# Patient Record
Sex: FEMALE | Race: OTHER | HISPANIC OR LATINO | URBAN - METROPOLITAN AREA
[De-identification: names, ages, dates, MRNs, and addresses within clinical notes are randomized per-mention and may not be internally consistent; named-entity substitution may affect disease eponyms.]

---

## 2020-07-29 ENCOUNTER — EMERGENCY (EMERGENCY)
Facility: HOSPITAL | Age: 35
LOS: 1 days | Discharge: HOME | End: 2020-07-29
Attending: EMERGENCY MEDICINE | Admitting: EMERGENCY MEDICINE
Payer: MEDICAID

## 2020-07-29 VITALS
SYSTOLIC BLOOD PRESSURE: 112 MMHG | OXYGEN SATURATION: 100 % | HEIGHT: 61 IN | HEART RATE: 58 BPM | TEMPERATURE: 98 F | DIASTOLIC BLOOD PRESSURE: 72 MMHG | WEIGHT: 132.28 LBS | RESPIRATION RATE: 17 BRPM

## 2020-07-29 VITALS
RESPIRATION RATE: 18 BRPM | HEART RATE: 75 BPM | DIASTOLIC BLOOD PRESSURE: 70 MMHG | OXYGEN SATURATION: 99 % | SYSTOLIC BLOOD PRESSURE: 117 MMHG

## 2020-07-29 DIAGNOSIS — M54.9 DORSALGIA, UNSPECIFIED: ICD-10-CM

## 2020-07-29 LAB
ANION GAP SERPL CALC-SCNC: 6 MMOL/L — SIGNIFICANT CHANGE UP (ref 5–17)
BASOPHILS # BLD AUTO: 0.01 K/UL — SIGNIFICANT CHANGE UP (ref 0–0.2)
BASOPHILS NFR BLD AUTO: 0.2 % — SIGNIFICANT CHANGE UP (ref 0–2)
BUN SERPL-MCNC: 10 MG/DL — SIGNIFICANT CHANGE UP (ref 7–23)
CALCIUM SERPL-MCNC: 8.9 MG/DL — SIGNIFICANT CHANGE UP (ref 8.4–10.5)
CHLORIDE SERPL-SCNC: 103 MMOL/L — SIGNIFICANT CHANGE UP (ref 96–108)
CO2 SERPL-SCNC: 31 MMOL/L — SIGNIFICANT CHANGE UP (ref 22–31)
CREAT SERPL-MCNC: 0.72 MG/DL — SIGNIFICANT CHANGE UP (ref 0.5–1.3)
D DIMER BLD IA.RAPID-MCNC: <150 NG/ML DDU — SIGNIFICANT CHANGE UP
EOSINOPHIL # BLD AUTO: 0.08 K/UL — SIGNIFICANT CHANGE UP (ref 0–0.5)
EOSINOPHIL NFR BLD AUTO: 1.4 % — SIGNIFICANT CHANGE UP (ref 0–6)
GLUCOSE SERPL-MCNC: 119 MG/DL — HIGH (ref 70–99)
HCT VFR BLD CALC: 38.5 % — SIGNIFICANT CHANGE UP (ref 34.5–45)
HGB BLD-MCNC: 12.9 G/DL — SIGNIFICANT CHANGE UP (ref 11.5–15.5)
IMM GRANULOCYTES NFR BLD AUTO: 0.3 % — SIGNIFICANT CHANGE UP (ref 0–1.5)
LYMPHOCYTES # BLD AUTO: 1.85 K/UL — SIGNIFICANT CHANGE UP (ref 1–3.3)
LYMPHOCYTES # BLD AUTO: 31.6 % — SIGNIFICANT CHANGE UP (ref 13–44)
MCHC RBC-ENTMCNC: 31.1 PG — SIGNIFICANT CHANGE UP (ref 27–34)
MCHC RBC-ENTMCNC: 33.5 GM/DL — SIGNIFICANT CHANGE UP (ref 32–36)
MCV RBC AUTO: 92.8 FL — SIGNIFICANT CHANGE UP (ref 80–100)
MONOCYTES # BLD AUTO: 0.31 K/UL — SIGNIFICANT CHANGE UP (ref 0–0.9)
MONOCYTES NFR BLD AUTO: 5.3 % — SIGNIFICANT CHANGE UP (ref 2–14)
NEUTROPHILS # BLD AUTO: 3.59 K/UL — SIGNIFICANT CHANGE UP (ref 1.8–7.4)
NEUTROPHILS NFR BLD AUTO: 61.2 % — SIGNIFICANT CHANGE UP (ref 43–77)
NRBC # BLD: 0 /100 WBCS — SIGNIFICANT CHANGE UP (ref 0–0)
PLATELET # BLD AUTO: 245 K/UL — SIGNIFICANT CHANGE UP (ref 150–400)
POTASSIUM SERPL-MCNC: 3.6 MMOL/L — SIGNIFICANT CHANGE UP (ref 3.5–5.3)
POTASSIUM SERPL-SCNC: 3.6 MMOL/L — SIGNIFICANT CHANGE UP (ref 3.5–5.3)
RBC # BLD: 4.15 M/UL — SIGNIFICANT CHANGE UP (ref 3.8–5.2)
RBC # FLD: 12 % — SIGNIFICANT CHANGE UP (ref 10.3–14.5)
SODIUM SERPL-SCNC: 140 MMOL/L — SIGNIFICANT CHANGE UP (ref 135–145)
WBC # BLD: 5.86 K/UL — SIGNIFICANT CHANGE UP (ref 3.8–10.5)
WBC # FLD AUTO: 5.86 K/UL — SIGNIFICANT CHANGE UP (ref 3.8–10.5)

## 2020-07-29 PROCEDURE — 93010 ELECTROCARDIOGRAM REPORT: CPT

## 2020-07-29 PROCEDURE — 72070 X-RAY EXAM THORAC SPINE 2VWS: CPT

## 2020-07-29 PROCEDURE — 71045 X-RAY EXAM CHEST 1 VIEW: CPT

## 2020-07-29 PROCEDURE — 99284 EMERGENCY DEPT VISIT MOD MDM: CPT | Mod: 25

## 2020-07-29 PROCEDURE — 85379 FIBRIN DEGRADATION QUANT: CPT

## 2020-07-29 PROCEDURE — 72070 X-RAY EXAM THORAC SPINE 2VWS: CPT | Mod: 26

## 2020-07-29 PROCEDURE — 96372 THER/PROPH/DIAG INJ SC/IM: CPT

## 2020-07-29 PROCEDURE — U0003: CPT

## 2020-07-29 PROCEDURE — 80048 BASIC METABOLIC PNL TOTAL CA: CPT

## 2020-07-29 PROCEDURE — 71045 X-RAY EXAM CHEST 1 VIEW: CPT | Mod: 26

## 2020-07-29 PROCEDURE — 81025 URINE PREGNANCY TEST: CPT

## 2020-07-29 PROCEDURE — 85027 COMPLETE CBC AUTOMATED: CPT

## 2020-07-29 PROCEDURE — 99285 EMERGENCY DEPT VISIT HI MDM: CPT

## 2020-07-29 PROCEDURE — 93005 ELECTROCARDIOGRAM TRACING: CPT

## 2020-07-29 PROCEDURE — 36415 COLL VENOUS BLD VENIPUNCTURE: CPT

## 2020-07-29 RX ORDER — KETOROLAC TROMETHAMINE 30 MG/ML
30 SYRINGE (ML) INJECTION ONCE
Refills: 0 | Status: DISCONTINUED | OUTPATIENT
Start: 2020-07-29 | End: 2020-07-29

## 2020-07-29 RX ORDER — LIDOCAINE 4 G/100G
1 CREAM TOPICAL ONCE
Refills: 0 | Status: COMPLETED | OUTPATIENT
Start: 2020-07-29 | End: 2020-07-29

## 2020-07-29 RX ADMIN — LIDOCAINE 1 PATCH: 4 CREAM TOPICAL at 13:04

## 2020-07-29 RX ADMIN — Medication 30 MILLIGRAM(S): at 13:03

## 2020-07-29 RX ADMIN — Medication 30 MILLIGRAM(S): at 14:47

## 2020-07-29 NOTE — ED PROVIDER NOTE - PATIENT PORTAL LINK FT
You can access the FollowMyHealth Patient Portal offered by [facility] by registering at the following website: [email or web address]. By joining [employer]’s FollowMyHealth portal, you will also be able to view your health information using other applications (apps) compatible with our system.

## 2020-07-29 NOTE — ED PROVIDER NOTE - OBJECTIVE STATEMENT
Patient states she has been experiencing upper back pain worse with movement. No fever or chills . No sob  No recent travel

## 2020-07-29 NOTE — ED PROVIDER NOTE - NSFOLLOWUPINSTRUCTIONS_ED_ALL_ED_FT
Dolor de espalda maliha en los adultos  Acute Back Pain, Adult  El dolor de espalda maliha es repentino y por lo general no dura mucho tiempo. Se debe generalmente a nickolas lesión de los músculos y tejidos de la espalda. La lesión puede ser el resultado de:  Estiramiento en exceso o desgarro (esguince) de un músculo o ligamento. Los ligamentos son tejidos que conectan los huesos. Levantar algo de forma incorrecta puede producir un esguince de espalda.Desgaste (degeneración) de los discos vertebrales. Los discos vertebrales son tejido circular que proporciona acolchonamiento a los huesos de la columna vertebral (vértebras).Movimientos de giro, kayla al practicar deportes o realizar trabajos de jardinería.Un golpe en la espalda.Artritis.Es posible que le realicen un examen físico, análisis de laboratorio u otros estudios de diagnóstico por imágenes para encontrar la causa del dolor. El dolor de espalda maliha generalmente desaparece con reposo y cuidados en la casa.  Sigue estas indicaciones en tu casa:  Control del dolor, el entumecimiento y la hinchazón     Padmini los medicamentos de venta lizette y los recetados solamente kayla se lo haya indicado el médico.El médico puede recomendarle que se aplique hielo corrina las primeras 24 a 48 horas después del comienzo del dolor. Para hacer esto:  Ponga el hielo en nickolas bolsa plástica.Coloque nickolas toalla entre la piel y la bolsa.Coloque el hielo corrina 20 minutos, 2 a 3 veces por día.Si se lo indican, aplique calor en la alisson afectada con la frecuencia que le haya indicado el médico. Use la marilyn de calor que el médico le recomiende, kayla nickolas compresa de calor húmedo o nickolas almohadilla térmica.  Colóquese nickolas toalla entre la piel y la marilyn de calor.Aplique calor corrina 20 a 30 minutos.Retire la marilyn de calor si la piel se pone de color gonzalez brillante. [place] es especialmente importante si no puede sentir dolor, calor o frío. Corre un mayor riesgo de sufrir quemaduras.Actividad        No permanezca en la cama. Hacer reposo en la cama por más de 1 a 2 días puede demorar blankenship recuperación.Mantenga nickolas buena postura al sentarse y pararse. No se incline hacia adelante al sentarse ni se encorve al pararse.  Si trabaja en un escritorio, siéntese cerca de rach para no tener que inclinarse. Mantenga el mentón hacia abajo. Mantenga el nathan hacia atrás y los codos flexionados en ángulo recto. La posición de los brazos debe verse kayla la letra “L”.Cuando conduzca, siéntese elevado y cerca del volante. Agregue un apoyo para la espalda (lumbar) al asiento del automóvil, si es necesario.Realice caminatas cortas en superficies cooper tan pronto kayla le sea posible. Trate de caminar un poco más de tiempo cada día.No se siente, conduzca o permanezca de pie en un mismo lugar corrina más de 30 minutos seguidos. Pararse o sentarse corrina largos períodos de tiempo puede sobrecargar la espalda.No conduzca ni use maquinaria pesada mientras padmini analgésicos recetados.Use técnicas apropiadas para levantar objetos. Cuando se inclina y levanta un objeto, utilice posiciones que no sobrecarguen tanto la espalda:  Flexione las rodillas.Mantenga la carga cerca del cuerpo.No gire el cuerpo.Celso actividad física habitualmente kayla se lo haya indicado el médico. Hacer ejercicios ayuda a que la espalda sane más rápido y ayuda a evitar las lesiones de la espalda al mantener los músculos staci y flexibles.Trabaje con un fisioterapeuta para crear un programa de ejercicios seguros, según lo recomiende el médico. Celso ejercicios kayla se lo haya indicado el fisioterapeuta.Estilo de jeff     Mantenga un peso saludable. El sobrepeso sobrecarga la espalda y hace que resulte difícil tener nickolas buena postura.Evite actividades o situaciones que lo stiven sentirse ansioso o estresado. El estrés y la ansiedad aumentan la tensión muscular y pueden empeorar el dolor de espalda. Aprenda formas de manejar la ansiedad y el estrés, kayla a través del ejercicio.Indicaciones generales     Duerma sobre un colchón firme en nickolas posición cómoda. Intente acostarse de costado, con las rodillas ligeramente flexionadas. Si se recuesta sobre la espalda, coloque nickolas almohada debajo de las rodillas.Siga el plan de tratamiento kayla se lo haya indicado el médico. Puede incluir:  Terapia cognitiva o conductual.Acupuntura o terapia de masajes.Yoga o meditación.Comunícate con un médico si:  Siente un dolor que no se sánchez con reposo o medicamentos.Siente mucho dolor que se extiende a las piernas o las nalgas.El dolor no mejora luego de 2 semanas.Siente dolor por la noche.Pierde peso sin proponérselo.Tiene fiebre o escalofríos.Solicite ayuda inmediatamente si:  Tiene nuevos problemas para controlar la vejiga o los intestinos.Siente debilidad o adormecimiento inusuales en los brazos o en las piernas.Siente náuseas o vómitos.Siente dolor abdominal.Siente que va a desmayarse.Resumen  El dolor de espalda maliha es repentino y por lo general no dura mucho tiempo.Use técnicas apropiadas para levantar objetos. Cuando se inclina y levanta un objeto, utilice posiciones que no sobrecarguen tanto la espalda.[place] los medicamentos de venta lizette o recetados y aplique calor o hielo solamente kayla se lo haya indicado el médico.Esta información no tiene kayla fin reemplazar el consejo del médico. Asegúrese de hacerle al médico cualquier pregunta que tenga.    Document Released: 12/18/2006 Document Revised: 07/26/2019 Document Reviewed: 10/04/2018  [employer] Patient Education © 2020 [employer]

## 2020-07-29 NOTE — ED PROVIDER NOTE - CARE PROVIDER_API CALL
Jose Francisco Aguilar  [facility]  [address] [place] [address]  Phone: [phone]  Fax: [phone]  Follow Up Time:

## 2020-07-29 NOTE — ED PROVIDER NOTE - CLINICAL SUMMARY MEDICAL DECISION MAKING FREE TEXT BOX
Patient has been experiencing upper back pain . Worse with with movement. Cxr ? infiltrate . Labs negative . Pain gone with toradol   instructed to follow up

## 2020-07-30 LAB — SARS-COV-2 RNA SPEC QL NAA+PROBE: SIGNIFICANT CHANGE UP

## 2020-08-30 ENCOUNTER — HOSPITAL ENCOUNTER (INPATIENT)
Dept: HOSPITAL 74 - JER | Age: 35
LOS: 2 days | Discharge: HOME | DRG: 263 | End: 2020-09-01
Attending: INTERNAL MEDICINE | Admitting: INTERNAL MEDICINE
Payer: COMMERCIAL

## 2020-08-30 VITALS — BODY MASS INDEX: 25.9 KG/M2

## 2020-08-30 DIAGNOSIS — K80.00: Primary | ICD-10-CM

## 2020-08-30 DIAGNOSIS — F17.210: ICD-10-CM

## 2020-08-30 LAB
ALBUMIN SERPL-MCNC: 4.2 G/DL (ref 3.4–5)
ALP SERPL-CCNC: 85 U/L (ref 45–117)
ALT SERPL-CCNC: 33 U/L (ref 13–61)
ANION GAP SERPL CALC-SCNC: 5 MMOL/L (ref 8–16)
APPEARANCE UR: CLEAR
AST SERPL-CCNC: 25 U/L (ref 15–37)
BACTERIA # UR AUTO: 32 /UL (ref 0–1359)
BASOPHILS # BLD: 0.3 % (ref 0–2)
BILIRUB SERPL-MCNC: 0.4 MG/DL (ref 0.2–1)
BILIRUB UR STRIP.AUTO-MCNC: NEGATIVE MG/DL
BUN SERPL-MCNC: 17.5 MG/DL (ref 7–18)
CALCIUM SERPL-MCNC: 9.1 MG/DL (ref 8.5–10.1)
CASTS URNS QL MICRO: 0 /UL (ref 0–3.1)
CHLORIDE SERPL-SCNC: 106 MMOL/L (ref 98–107)
CO2 SERPL-SCNC: 27 MMOL/L (ref 21–32)
COLOR UR: YELLOW
CREAT SERPL-MCNC: 0.6 MG/DL (ref 0.55–1.3)
DEPRECATED RDW RBC AUTO: 12.4 % (ref 11.6–15.6)
EOSINOPHIL # BLD: 2 % (ref 0–4.5)
EPITH CASTS URNS QL MICRO: 4 /UL (ref 0–25.1)
GLUCOSE SERPL-MCNC: 92 MG/DL (ref 74–106)
HCT VFR BLD CALC: 37.7 % (ref 32.4–45.2)
HGB BLD-MCNC: 12.9 GM/DL (ref 10.7–15.3)
KETONES UR QL STRIP: (no result)
LEUKOCYTE ESTERASE UR QL STRIP.AUTO: NEGATIVE
LIPASE SERPL-CCNC: 91 U/L (ref 73–393)
LYMPHOCYTES # BLD: 24 % (ref 8–40)
MCH RBC QN AUTO: 32.2 PG (ref 25.7–33.7)
MCHC RBC AUTO-ENTMCNC: 34.2 G/DL (ref 32–36)
MCV RBC: 94.1 FL (ref 80–96)
MONOCYTES # BLD AUTO: 5.2 % (ref 3.8–10.2)
NEUTROPHILS # BLD: 68.5 % (ref 42.8–82.8)
NITRITE UR QL STRIP: NEGATIVE
PH UR: 5 [PH] (ref 5–8)
PLATELET # BLD AUTO: 318 K/MM3 (ref 134–434)
PMV BLD: 8.2 FL (ref 7.5–11.1)
POTASSIUM SERPLBLD-SCNC: 4.2 MMOL/L (ref 3.5–5.1)
PROT SERPL-MCNC: 7.7 G/DL (ref 6.4–8.2)
PROT UR QL STRIP: NEGATIVE
PROT UR QL STRIP: NEGATIVE
RBC # BLD AUTO: 4.01 M/MM3 (ref 3.6–5.2)
RBC # BLD AUTO: 7 /UL (ref 0–23.9)
SODIUM SERPL-SCNC: 139 MMOL/L (ref 136–145)
SP GR UR: 1.01 (ref 1.01–1.03)
UROBILINOGEN UR STRIP-MCNC: 1 MG/DL (ref 0.2–1)
WBC # BLD AUTO: 9.1 K/MM3 (ref 4–10)
WBC # UR AUTO: 4 /UL (ref 0–25.8)

## 2020-08-30 PROCEDURE — U0003 INFECTIOUS AGENT DETECTION BY NUCLEIC ACID (DNA OR RNA); SEVERE ACUTE RESPIRATORY SYNDROME CORONAVIRUS 2 (SARS-COV-2) (CORONAVIRUS DISEASE [COVID-19]), AMPLIFIED PROBE TECHNIQUE, MAKING USE OF HIGH THROUGHPUT TECHNOLOGIES AS DESCRIBED BY CMS-2020-01-R: HCPCS

## 2020-08-30 NOTE — PN
Teaching Attending Note


Name of Resident: Emily Salinas





ATTENDING PHYSICIAN STATEMENT





I saw and evaluated the patient.


I reviewed the resident's note and discussed the case with the resident.


I agree with the resident's findings and plan as documented.








SUBJECTIVE:


Patient is a 35-year-old woman with no reported PMH who presents to the ER 

worsening right upper quadrant and mid right back pain over the past 10 days.  

Patient seen and evaluated at [facility] and was told she had 

cholecystitis. Was discharged to Surgery clinic where she was supposed to 

follow-up on 9/1/20.  Patient reports the pain is getting worse and is unable to

describe the pain. Describes the pain as constant with no aggravating or 

alleviating factors. Patient denies chest pain, shortness of breath, headache, 

palpitations, dizziness, fever, chills, nausea, vomiting, diarrhea, 

constipation, dysuria, frequency, urgency, melena, hematochezia or hematuria. 

LMP was August 16, 2020 and her periods are irregular. Denies alcohol, tobacco 

or illicit drug use. No sick contacts or recent travels. Family history is 

unremarkable.  





OBJECTIVE:


Alert


                                   Vital Signs











 Period  Temp  Pulse  Resp  BP Sys/Miranda  Pulse Ox


 


 Last 24 Hr  98.6 F  80  18  110/79  100








HEENT: No Jaundice, eye redness or discharge, PERRLA, EOMI. Normocephalic, 

atraumatic. External ears are normal and hearing is grossly intact. No nasal 

discharge.


Neck: Supple, nontender. No palpable adenopathy or thyromegaly. No JVD


Chest: Good effort. Clear to auscultation and percussion.


Heart: Regular. No S3, rub or murmur


Abdomen: Not distended, soft, nontender and no HSM. No rebound or guarding. 

Normal bowel sounds.


Ext: Peripheral pulses intact. No leg edema.


Skin: Warm and dry. No petechiae, rash or ecchymosis.


Neuro: Alert. Oriented x3. CN 2-12 grossly intact. Sensation grossly intact in 

all four extremities and DTR are symmetric.


Psych: Appropriate mood and affect. Good insight.


                              Abnormal Lab Results











  08/30/20 08/30/20





  21:09 22:45


 


Anion Gap  5 L 


 


Urine Ketones   1+ H


 


Urine Blood   3+ H





                                        


                               Current Medications











Generic Name Dose Route Start Last Admin





  Trade Name Freq  PRN Reason Stop Dose Admin


 


Docusate Sodium  100 mg  08/31/20 10:00 





  Colace -  PO  





  DAILY RIYA  


 


Metronidazole  500 mg in 100 mls @ 100 mls/hr  08/31/20 02:00 





  Flagyl 500mg Premixed Ivpb -  IVPB  





  Q8H-IV RIYA  


 


Morphine Sulfate  2 mg  08/31/20 01:47 





  Morphine Sulfate  IVPUSH  





  Q4H PRN  





  PAIN LEVEL 7 - 10  











ASSESSMENT AND PLAN:


1. Cholelithiasis; Rule out Kidney stone/Cholecyctitis - Ultrasound read by 

imaging on-call shows - Sludge and gallstones in the gallbladder including a 2.4

cm gallstone at the neck of the gallbladder. No significant gallbladder wall 

thickening or pericholecystic fluid. Slightly dilated common bile duct measuring

6.1 mm.





Viral testing for COVID-19 ordered and patient placed on airborne, droplet and 

contact isolation. ER staff prescribed Zofran, IV Ceftriaxone, IV Morphine and 

IV NS for the patient and consulted Surgery. Patient has hematuria and based on 

location of pain, will get Sipral CT scan of abdomen/pelvis to rule out kidney 

stone. Will add IV Flagyl and keep her NPO. Will get MRCP and consult GI if 

kidney stone is ruled out. CXR pending. EKG shows NSR at 69/minute and QTc 454 

with no ischemic ST-T wave changes. Initial troponin is negative. 





2. DVT prophylaxis - SCD.





3. Advance directives - Full code

## 2020-08-30 NOTE — PDOC
History of Present Illness





- General


Chief Complaint: Pain


Stated Complaint: Abdomial Pain


Time Seen by Provider: 08/30/20 19:35


History Source: Patient, Old Records


Exam Limitations: No Limitations





- History of Present Illness


Travel History: No


Initial Comments: 





08/30/20 19:43





HISTORY OF PRESENT ILLNESS: 35-year-old woman presents emergency department with

continued right upper quadrant and mid right back pain which is been worse over 

the past 10 days.  Patient seen and evaluated at [facility] was told 

she had cholecystitis was discharged to surgery clinic where she was supposed to

follow-up 9/1.  Patient reports the pain is getting worse and is unable to 

describe the pain.  She reports the pain is constant was unable to identify any 

aggravating or alleviating factors.





No recent travel or sick contacts. 


PAST MEDICAL HISTORY: Cholecystitis


SURGICAL HISTORY: Denies


ALLERGIES: No known drug allergies





REVIEW OF SYSTEMS


General/Constitutional: Denies fever or chills. Denies weakness, weight change.


HEENT: Denies change in vision. Denies ear pain or discharge. Denies sore 

throat.


Cardiovascular: Denies chest pain or shortness of breath.


Respiratory: Denies cough, wheezing, or hemoptysis.


Gastrointestinal: See HPI


Genitourinary: Denies dysuria, frequency, or change in urination.


Musculoskeletal: Denies joint or muscle swelling or pain. Denies neck or back 

pain.


Skin and breasts: Denies rash or easy bruising.


Neurologic: Denies headache, vertigo, loss of consciousness, or loss of 

sensation.


Psychiatric: Denies depression or anxiety.


Endocrine: Denies increased thirst. Denies abnormal weight change.


Hematologic/Lymphatic: Denies anemia, easy bleeding, or history of blood clots.


Allergic/Immunologic: Denies hives or skin allergy. Denies latex allergy.











PHYSICAL EXAM


General Appearance: Well-appearing, appropriately dressed.  No apparent 

distress, no intoxication.


Respiratory/Chest: Lungs CTAB.  No shortness of breath, chest tenderness, 

respiratory distress, accessory muscle use. No crackles, rales, rhonchi, str

idor, wheezing, dullness


Cardiovascular: RRR. S1, S2.  No JVD, murmur, bradycardia, tachycardia.


Vascular Pulses: Dorsalis-Pedis (R): 2+, Dorsalis-Pedis (L): 2+


Gastrointestinal/Abdominal: Normal bowel sounds. Abdomen soft, non-distended.  

Right upper quadrant tenderness with Nation sign.  Guarding present in the right

upper and left upper quadrants. No organomegaly, pulsatile mass, hernia, hepato

megaly, splenomegaly. 








Past History





- Medical History


Allergies/Adverse Reactions: 


                                    Allergies











Allergy/AdvReac Type Severity Reaction Status Date / Time


 


No Known Allergies Allergy   Verified 08/30/20 18:52











Home Medications: 


Ambulatory Orders





NK [No Known Home Medication]  08/31/20 








COPD: No





- Reproductive History


Is Patient Pregnant Now?: No





- Psycho-Social/Smoking History


Smoking History: Never smoked





- Substance Abuse Hx (Audit-C & DAST Scrn)


How often the patient has a drink containing alcohol: Never


Score: In Men: 4 or > Positive; In Women: 3 or > Positive: 0


Screen Result (Pos requires Nsg. Audit-10AR): Negative





*Physical Exam





- Vital Signs


                                Last Vital Signs











Temp Pulse Resp BP Pulse Ox


 


 98.6 F   80   18   110/79   100 


 


 08/30/20 18:49  08/30/20 18:49  08/30/20 18:49  08/30/20 18:49  08/30/20 18:49














ED Treatment Course





- LABORATORY


CBC & Chemistry Diagram: 


                                 09/01/20 09:25





                                 09/01/20 09:25





- RADIOLOGY


Radiology Studies Ordered: 














 Category Date Time Status


 


 GALLBLADDER US [US] Stat Ultrasound  08/30/20 19:42 Ordered














Medical Decision Making





- Medical Decision Making





08/30/20 19:46


A/P:


35-year-old woman with back and right upper quadrant pain for 10 days





Patient likely with cholecystitis as she was recently diagnosed with the same.  

As patient does have back pain I will get urine to evaluate for nephrotic 

etiology


Labs including type and screen coags


EKG


Right upper quadrant ultrasound


Normal saline 1 L IV bolus


N.p.o.


Morphine 4 mg IV push


Zofran 4 mg IV push


Reassess


08/30/20 22:13


                                Laboratory Tests











  08/30/20 08/30/20





  21:09 21:09


 


WBC  9.1 


 


RBC  4.01 


 


Hgb  12.9 


 


Hct  37.7 


 


MCV  94.1 


 


MCH  32.2 


 


MCHC  34.2 


 


RDW  12.4 


 


Plt Count  318 


 


MPV  8.2 


 


Absolute Neuts (auto)  6.3 


 


Neutrophils %  68.5 


 


Lymphocytes %  24.0 


 


Monocytes %  5.2 


 


Eosinophils %  2.0 


 


Basophils %  0.3 


 


Nucleated RBC %  0 


 


Sodium   139


 


Potassium   4.2


 


Chloride   106


 


Carbon Dioxide   27


 


Anion Gap   5 L


 


BUN   17.5


 


Creatinine   0.6


 


Est GFR (CKD-EPI)AfAm   135.91


 


Est GFR (CKD-EPI)NonAf   117.26


 


Random Glucose   92


 


Calcium   9.1


 


Total Bilirubin   0.4


 


AST   25


 


ALT   33


 


Alkaline Phosphatase   85


 


Total Protein   7.7


 


Albumin   4.2


 


Lipase   91








Ultrasound is read by imaging on-call: Sludge and gallstones in the gallbladder 

including a 2.4 cm gallstone at the neck of the gallbladder.  No significant 

gallbladder wall thickening or pericholecystic fluid.





Slightly dilated common bile duct measuring 6.1 mm





As patient has a positive Nation sign combined with sludge and gallstones I will

 admit the patient for cholecystitis and contact Dr. Thayer for surgical co

nsult.





Phone call placed to Dr. Thayer and received voicemail.  Left message to return 

call.


08/30/20 22:48


Case has been discussed with Dr. Hernández of the hospitalist service who 

accepts patient for admission under Dr. Vega.





Discharge





- Discharge Information


Problems reviewed: Yes


Clinical Impression/Diagnosis: 


 Cholecystitis





Condition: Stable


Disposition: HOME





- Admission


Yes





- Follow up/Referral





- Patient Discharge Instructions





- Post Discharge Activity

## 2020-08-31 LAB
ALBUMIN SERPL-MCNC: 3.2 G/DL (ref 3.4–5)
ALP SERPL-CCNC: 65 U/L (ref 45–117)
ALT SERPL-CCNC: 23 U/L (ref 13–61)
ANION GAP SERPL CALC-SCNC: 5 MMOL/L (ref 8–16)
APTT BLD: 34.6 SECONDS (ref 25.2–36.5)
AST SERPL-CCNC: 13 U/L (ref 15–37)
BASOPHILS # BLD: 0.4 % (ref 0–2)
BILIRUB SERPL-MCNC: 0.7 MG/DL (ref 0.2–1)
BUN SERPL-MCNC: 12.3 MG/DL (ref 7–18)
CALCIUM SERPL-MCNC: 8.3 MG/DL (ref 8.5–10.1)
CHLORIDE SERPL-SCNC: 109 MMOL/L (ref 98–107)
CO2 SERPL-SCNC: 27 MMOL/L (ref 21–32)
CREAT SERPL-MCNC: 0.5 MG/DL (ref 0.55–1.3)
DEPRECATED RDW RBC AUTO: 12.2 % (ref 11.6–15.6)
EOSINOPHIL # BLD: 3 % (ref 0–4.5)
GLUCOSE SERPL-MCNC: 93 MG/DL (ref 74–106)
HCT VFR BLD CALC: 34.3 % (ref 32.4–45.2)
HGB BLD-MCNC: 11.7 GM/DL (ref 10.7–15.3)
INR BLD: 1.07 (ref 0.83–1.09)
LYMPHOCYTES # BLD: 31.6 % (ref 8–40)
MAGNESIUM SERPL-MCNC: 2.3 MG/DL (ref 1.8–2.4)
MCH RBC QN AUTO: 31.3 PG (ref 25.7–33.7)
MCHC RBC AUTO-ENTMCNC: 34 G/DL (ref 32–36)
MCV RBC: 92 FL (ref 80–96)
MONOCYTES # BLD AUTO: 6.7 % (ref 3.8–10.2)
NEUTROPHILS # BLD: 58.3 % (ref 42.8–82.8)
PHOSPHATE SERPL-MCNC: 3.2 MG/DL (ref 2.5–4.9)
PLATELET # BLD AUTO: 287 K/MM3 (ref 134–434)
PMV BLD: 7.7 FL (ref 7.5–11.1)
POTASSIUM SERPLBLD-SCNC: 3.9 MMOL/L (ref 3.5–5.1)
PROT SERPL-MCNC: 6 G/DL (ref 6.4–8.2)
PT PNL PPP: 12.6 SEC (ref 9.7–13)
RBC # BLD AUTO: 3.73 M/MM3 (ref 3.6–5.2)
SODIUM SERPL-SCNC: 141 MMOL/L (ref 136–145)
WBC # BLD AUTO: 5.6 K/MM3 (ref 4–10)

## 2020-08-31 PROCEDURE — 0FT44ZZ RESECTION OF GALLBLADDER, PERCUTANEOUS ENDOSCOPIC APPROACH: ICD-10-PCS | Performed by: SURGERY

## 2020-08-31 RX ADMIN — SODIUM CHLORIDE, POTASSIUM CHLORIDE, SODIUM LACTATE AND CALCIUM CHLORIDE SCH MLS/HR: 600; 310; 30; 20 INJECTION, SOLUTION INTRAVENOUS at 20:10

## 2020-08-31 NOTE — EKG
Test Reason : 

Blood Pressure : ***/*** mmHG

Vent. Rate : 069 BPM     Atrial Rate : 069 BPM

   P-R Int : 136 ms          QRS Dur : 082 ms

    QT Int : 424 ms       P-R-T Axes : 015 053 041 degrees

   QTc Int : 454 ms

 

NORMAL SINUS RHYTHM

NORMAL ECG

NO PREVIOUS ECGS AVAILABLE

Confirmed by RADHA BRUNO MD ([id number]) on 8/31/2020 11:16:14 AM

 

Referred By:             Confirmed By:RADHA BRUNO MD

## 2020-08-31 NOTE — OP
Operative Note





- Note:


Operative Date: 08/31/20


Pre-Operative Diagnosis: acute cholecystitis/cholelithiasis


Operation: laparoscopic cholecystectomy


Findings: 





acute cholecystitis/cholelithiasis/prominent cystic duct node


Post-Operative Diagnosis: Same as Pre-op


Surgeon: Mamadou Thayer


Assistant: Ventura Olea


Anesthesiologist/CRNA: Magaly Colbert


Anesthesia: General


Specimens Removed: gallbladder and contents


Estimated Blood Loss (mls): 20


Drains & Tubes with Location: 10 mm ROBBY in the gallbladder fossa

## 2020-08-31 NOTE — SURG
Surgery First Assist Note


First Assist: Ventura Olea PA-C (Suzy)


Date of Service: 08/31/20


Diagnosis: 





acute cholecystitis/cholelithiasis





Procedure: 





Operation: laparoscopic cholecystectomy


I was present for the entirety of the operative procedure. For further detail, 

please refer to operative report.








Visit type





- Case Type


Case Type: Scheduled





- Emergency


Emergency Visit: Yes


ED Registration Date: 08/30/20


Care time: The patient presented to the Emergency Department on the above date 

and was hospitalized for further evaluation of their emergent condition.





- New patient


This patient is new to me today: Yes


Date on this admission: 08/31/20





- Critical Care


Critical Care patient: No

## 2020-08-31 NOTE — CON.GI
Consult


Consult Specialty:: GI


Referred by:: Hospitalist Service


Reason for Consultation:: Abdominal pain





- History of Present Illness


Chief Complaint: [employer]  [id number]: right upper abdominal pain 

and back pain


History of Present Illness: 





35F admitted through [facility] ER for evaluation of abdominal pain.  She states that 

she has been experiencing RUQ pain radiating to the right mid back 

intermittently for about a month now.  Pain worsens at night and 30 mins after 

meals. was lasting for a couple of hours, sometimes through the night.  

associated nausea.  Seen at [facility], states that she was going to 

have to wait for a bed so left. Noted to have RUQ pain and a nation's sign at 

the [facility] ER. Imaging studies not read officially as of yet, but stones and 

sludge in the gallbladder was noted on preliminary reading, including a 2.4cm 

stone at the neck of the gallbladder. a 6.1mm CBD was described as well.   





- History Source


History Provided By: Patient


Limitations to Obtaining History: No Limitations





- Past Medical History


Hepatobiliary: Yes: Cholelithiasis


...LMP: 08/16/20


...Pregnant: No





- Past Surgical History


Additional Surgical History: None





- Alcohol/Substance Use


Hx Alcohol Use: No


History of Substance Use: reports: None





- Smoking History


Smoking history: Current every day smoker





- Social History


Usual Living Arrangement: With Spouse


ADL: Independent


Occupation: Unemplyed


Place of Birth: Other ([place])


Came to [place]year[place] 2017


History of Recent Travel: No





Home Medications





- Allergies


Allergies/Adverse Reactions: 


                                    Allergies











Allergy/AdvReac Type Severity Reaction Status Date / Time


 


No Known Allergies Allergy   Verified 08/30/20 18:52














Family Medical History


Other Family History: Mother: healthy.  Father: healthy.  Healthy Siblings.  No 

children





Review of Systems





- Review of Systems


Constitutional: denies: Chills


Cardiovascular: denies: Chest Pain


Respiratory: denies: Cough


Gastrointestinal: reports: Abdominal Pain, Nausea.  denies: Vomiting, Vomiting 

Blood


Genitourinary: denies: Discharge





Physical Exam-GI


Vital Signs: 


                                   Vital Signs











Temperature  98.1 F   08/31/20 06:00


 


Pulse Rate  64   08/31/20 06:00


 


Respiratory Rate  16   08/31/20 06:00


 


Blood Pressure  101/60   08/31/20 06:00


 


O2 Sat by Pulse Oximetry (%)  99   08/31/20 06:00











Constitutional: Yes: Calm


Eyes: No: Sclera Icterus


Cardiovascular: Yes: Regular Rate and Rhythm


Respiratory: Yes: CTA Bilaterally


Gastrointestinal Inspection: No: Distention


...Auscultate: Yes: Normoactive Bowel Sounds


...Palpate: Yes: Tenderness (Mild TTP RUQ)


...Percussion: No: Tympanitic


Edema: No (No LE edema)


Neurological: Yes: Alert


Labs: 


                                    CBC, BMP





                                 08/31/20 08:13 





                                    INR, PTT











INR  1.07  (0.83-1.09)   08/30/20  01:25    














Problem List





- Problems


(1) Recurrent biliary colic


Assessment/Plan: 


Bys description, suspect patient is experiencing biliary colic. No leukocytosis 

and fevers that would suggest acute cholecystiti, however,  Nation's sign and 

persistent RUQ pain described in ER note.


Advise:


NPO


IV Hydration


IV abx


Follow-up AM labs. CBD described as 6mm. not particularly dilated, however, if 

AM labs reveal obstructive bilary pattern, patient would benefit from MRCP with 

possible ERCP prior to [facility]. 


Code(s): K80.50 - CALCULUS OF BILE DUCT W/O CHOLANGITIS OR CHOLECYST W/O OBST

## 2020-08-31 NOTE — HP
CHIEF COMPLAINT:


Elvie mane





PCP:


none





HISTORY OF PRESENT ILLNESS:


34yo F with no PMHx who presents with ~1month of perspinal right-sided back pain

that radiates to her right flank and right upper abdominal quadrant. The pain 

started about a month ago, and the patient took OTC analgesic medications. 

However, the pain progressed, which prompted her to go to [facility].

Per patient, they did not give her any pain medication or any other form of 

treatment before discharging her. The pain continued, which is why she returned 

to the ED today. She endorsed some associated nausea, but denied headaches, 

fevers, chills, dizziness, diarrhea, constipation, polyuria, urinary frequency. 

A friend is at bedside. Patient speaks Malay -  Sakti3 

offered assistance. 





ER course was notable for:


(1) UA showing 3+ blood and 1+ ketones 


(2) patient is afebrile without lymphocytosis, and LFTs are in range


(3) limited US of abdominal RUQ: multiple gallstones, sludge, 2.4cm stone in 

gallbladder neck, slightly dilated common bile duct 6.1 mm





Recent Travel:


denied 





PAST MEDICAL HISTORY:


none





PAST SURGICAL HISTORY:


none





Gyn History:


LMP 8/16/2020


menstrual cycles are irregular - every 2, 3, or sometimes 6 months 





Family history:


noncontributory - patient denied any history of cancers, DM, HTN, or any other 

chronic diseases in her family 





Social History:


Smoking: maybe 1 cigaret per week


Alcohol: socially 


Drugs: denied 


Job: does not work


Home: lives at home with  





Allergies


No Known Allergies Allergy (Verified 08/30/20 18:52)


   


HOME MEDICATIONS:


patient takes no medications at home 





REVIEW OF SYSTEMS


as per HPI 





PHYSICAL EXAMINATION


                               Vital Signs - 24 hr











  08/30/20





  18:49


 


Temperature 98.6 F


 


Pulse Rate 80


 


Respiratory 18





Rate 


 


Blood Pressure 110/79


 


O2 Sat by Pulse 100





Oximetry (%) 











GENERAL: Awake, alert, and fully oriented, in no acute distress.


HEAD: Normal with no signs of trauma.


EYES: Pupils equal, round and reactive to light, extraocular movements intact, 

sclera anicteric, conjunctiva clear. No lid lag.


EARS, NOSE, THROAT: Ears normal, nares patent, oropharynx clear without 

exudates. Moist mucous membranes.


NECK: Normal range of motion, supple without lymphadenopathy, JVD, or masses.


LUNGS: Breath sounds equal, clear to auscultation bilaterally. No wheezes, and 

no crackles. No accessory muscle use.


HEART: Regular rate and rhythm, normal S1 and S2 without murmur, rub or gallop.


ABDOMEN: Soft, nontender, not distended, normoactive bowel sounds, no guarding, 

no rebound, no masses.  No hepatomegaly or  splenomegaly. 


MUSCULOSKELETAL: Normal range of motion at all joints. No bony deformities or 

tenderness. No CVA tenderness.


UPPER EXTREMITIES: 2+ pulses, warm, well-perfused. No cyanosis. No clubbing. No 

peripheral edema.


LOWER EXTREMITIES: 2+ pulses, warm, well-perfused. No calf tenderness. No 

peripheral edema. 


NEUROLOGICAL:  Cranial nerves II-XII intact. Normal speech. Normal gait.


PSYCHIATRIC: Cooperative. Good eye contact. Appropriate mood and affect.


SKIN: Warm, dry, normal turgor, no rashes or lesions noted, normal capillary 

refill. 


                                        


                               Urine Test Results











Urine Color  Yellow   08/30/20  22:45    


 


Urine Appearance  Clear   08/30/20  22:45    


 


Urine pH  5.0  (5.0-8.0)   08/30/20  22:45    


 


Ur Specific Gravity  1.010  (1.010-1.035)   08/30/20  22:45    


 


Urine Protein  Negative  (NEGATIVE)   08/30/20  22:45    


 


Urine Glucose (UA)  Negative  (NEGATIVE)   08/30/20  22:45    


 


Urine Ketones  1+  (NEGATIVE)  H  08/30/20  22:45    


 


Urine Blood  3+  (NEGATIVE)  H  08/30/20  22:45    


 


Urine Nitrite  Negative  (NEGATIVE)   08/30/20  22:45    


 


Urine Bilirubin  Negative  (NEGATIVE)   08/30/20  22:45    


 


Ur Leukocyte Esterase  Negative  (NEGATIVE)   08/30/20  22:45    











ASSESSMENT/PLAN:


34yo F with no PMHx who presented with ~1month of perspinal right-sided back 

pain that radiates to her right flank and right upper abdominal quadrant. ED 

workup was remarkable for UA showing 3+ blood and 1+ ketones, and limited US of 

abdominal RUQ showed multiple gallstones, sludge, 2.4cm stone in gallbladder 

neck, and slightly dilated common bile duct 6.1 mm. Patient was afebrile without

lymphocytosis, and LFTs were in range. Patient was admitted for 

choledocolythiasis vs biliary colic vs nephrolithiasis. 





#Right-sided back pain that radiates to R flank and RUQ of abdomen - 


likely nephrolithiasis due to clinical presentation of pain and patient being 

afebrile without lymphocytosis, and LFTs in range


vs


cholelithiasis due to RUQ pain, previously reported positive Nation's sign (my 

exam was s/p morphine)


vs


biliary colic due to RUQ pain and patient being afebrile without lymphocytosis, 

and LFTs in range


- consulted surgery - will see patient in the morning


- ordered spiral renal-stone CT - f/u results 


- continue IVFs


- receiving ceftraixone and metronidazole 


- PRN morphine for pain control (colace) 


*if spiral CT does not reveal stones, patient may benefit from MRCP (or HIDA 

scan) - would recommend consulting GI





#PPX


- DVT: SCDs





#FEN


- 1L/h NS - change to 100cc/h maintenance fluids


- replete lytes PRN


- NPO 





Dispo: continue monitoring patient in [facility] 














Family Medical History


Family History: As Documented





Visit type





- Emergency Visit


Emergency Visit: Yes


ED Registration Date: 08/30/20


Care time: The patient presented to the Emergency Department on the above date 

and was hospitalized for further evaluation of their emergent condition.





- New Patient


This patient is new to me today: Yes


Date on this admission: 08/31/20





- Critical Care


Critical Care patient: No





ATTENDING PHYSICIAN STATEMENT





I saw and evaluated the patient.


I reviewed the resident's note and discussed the case with the resident.


I agree with the resident's findings and plan as documented.








SUBJECTIVE:








OBJECTIVE:








ASSESSMENT AND PLAN:

## 2020-08-31 NOTE — CONSULT
<Ventura Olea - Last Filed: 08/31/20 17:12>





- Consultation


REQUESTING PROVIDER: 





CONSULT REQUEST: We have been asked to surgically evaluate this patient for 

cholelithiasis/biliary colic





PCP:Hossein Marquez MD





HISTORY OF PRESENT ILLNESS: 36 y/o F w/ no significant PMHx a/w c/o abdo pain. 

Pt reports she has been having RUQ pain with radiation to the mid back for the 

past month. Pain is noted to worsen after meals and at night, states pain has 

gotten significantly worse over the past few days. Pt states she was seen at 

[facility] last week, however left as she was told she would have to 

wait for a bed. Endorses nausea, no constipation or diarrhea. 


U/S revealing GB sludge and a 2.6cm stone at the neck of the gallbladder. a 

6.1mm CBD was described as well.   





PMHx:          as above





PSHx:        denies


                                    Allergies











Allergy/AdvReac Type Severity Reaction Status Date / Time


 


No Known Allergies Allergy   Verified 08/30/20 18:52








REVIEW OF SYSTEMS:


CONSTITUTIONAL: 


Absent:  fever, chills


CARDIOVASCULAR: 


Absent: chest pain, syncope


RESPIRATORY: 


Absent: cough, shortness of breath


GASTROINTESTINAL:


(+)abdominal pain, (-)abdominal distension, (+) nausea, (-)vomiting, (-

)diarrhea, (-)constipation


GENITOURINARY: 


Absent: dysuria, frequency





PHYSICAL EXAM:


GENERAL: Awake, alert, and fully oriented, in no acute distress.


HEAD: Normal with no signs of trauma.


LUNGS: No accessory muscle use on RA


ABDOMEN: Soft, + ruq ttp, not distended, normoactive bowel sounds, no guarding, 

no rebound














                                   Vital Signs











Temperature  98.1 F   08/31/20 06:00


 


Pulse Rate  64   08/31/20 06:00


 


Respiratory Rate  16   08/31/20 06:00


 


Blood Pressure  101/60   08/31/20 06:00


 


O2 Sat by Pulse Oximetry (%)  99   08/31/20 06:00








                                   Lab Results











WBC  5.6 K/mm3 (4.0-10.0)   08/31/20  08:13    


 


RBC  3.73 M/mm3 (3.60-5.2)   08/31/20  08:13    


 


Hgb  11.7 GM/dL (10.7-15.3)   08/31/20  08:13    


 


Hct  34.3 % (32.4-45.2)   08/31/20  08:13    


 


MCV  92.0 fl (80-96)   08/31/20  08:13    


 


MCHC  34.0 g/dl (32.0-36.0)   08/31/20  08:13    


 


RDW  12.2 % (11.6-15.6)   08/31/20  08:13    


 


Plt Count  287 K/MM3 (134-434)   08/31/20  08:13    


 


INR  1.07  (0.83-1.09)   08/30/20  01:25    


 


Sodium  141 mmol/L (136-145)   08/31/20  08:13    


 


Potassium  3.9 mmol/L (3.5-5.1)   08/31/20  08:13    


 


Chloride  109 mmol/L ()  H  08/31/20  08:13    


 


Carbon Dioxide  27 mmol/L (21-32)   08/31/20  08:13    


 


Anion Gap  5 MMOL/L (8-16)  L  08/31/20  08:13    


 


BUN  12.3 mg/dL (7-18)   08/31/20  08:13    


 


Creatinine  0.5 mg/dL (0.55-1.3)  L  08/31/20  08:13    


 


Random Glucose  93 mg/dL ()   08/31/20  08:13    


 


Calcium  8.3 mg/dL (8.5-10.1)  L  08/31/20  08:13    


 


Blood Type  O POSITIVE   08/30/20  21:09    


 


Antibody Screen  Negative   08/30/20  21:09    














a/P: 36 y/o F w/ no significant PMHx a/w c/o abdo pain. 








U/S revealing GB sludge and a 2.6cm stone at the neck of the gallbladder. a 

6.1mm CBD was described as well.   





Exam and testing c/w acute cholecystitis/cholelithiasis








npo for lap niyah this am


plan d/w pt who agrees








d/w attending Dr Thayer





<Mamadou Thayer - Last Filed: 09/22/20 08:38>





- Consultation


Attending Surgeon:


I personally saw and examined the patient. My examination reveals a patient with

symptomatic gallbladder disease. I discussed the case with the surgical PA and 

agree with their findings and plan of care with any exceptions as noted. ~ Mamadou Thayer MD, FACS

## 2020-09-01 VITALS — TEMPERATURE: 98.2 F | SYSTOLIC BLOOD PRESSURE: 113 MMHG | DIASTOLIC BLOOD PRESSURE: 63 MMHG

## 2020-09-01 VITALS — HEART RATE: 78 BPM

## 2020-09-01 LAB
ALBUMIN SERPL-MCNC: 3.4 G/DL (ref 3.4–5)
ALP SERPL-CCNC: 71 U/L (ref 45–117)
ALT SERPL-CCNC: 43 U/L (ref 13–61)
ANION GAP SERPL CALC-SCNC: 7 MMOL/L (ref 8–16)
AST SERPL-CCNC: 52 U/L (ref 15–37)
BILIRUB SERPL-MCNC: 1.2 MG/DL (ref 0.2–1)
BUN SERPL-MCNC: 5.8 MG/DL (ref 7–18)
CALCIUM SERPL-MCNC: 9 MG/DL (ref 8.5–10.1)
CHLORIDE SERPL-SCNC: 104 MMOL/L (ref 98–107)
CO2 SERPL-SCNC: 29 MMOL/L (ref 21–32)
CREAT SERPL-MCNC: 0.8 MG/DL (ref 0.55–1.3)
DEPRECATED RDW RBC AUTO: 12.6 % (ref 11.6–15.6)
GLUCOSE SERPL-MCNC: 152 MG/DL (ref 74–106)
HCT VFR BLD CALC: 36.2 % (ref 32.4–45.2)
HGB BLD-MCNC: 12.6 GM/DL (ref 10.7–15.3)
MAGNESIUM SERPL-MCNC: 2.4 MG/DL (ref 1.8–2.4)
MCH RBC QN AUTO: 32.4 PG (ref 25.7–33.7)
MCHC RBC AUTO-ENTMCNC: 34.9 G/DL (ref 32–36)
MCV RBC: 92.8 FL (ref 80–96)
PLATELET # BLD AUTO: 314 K/MM3 (ref 134–434)
PMV BLD: 7.7 FL (ref 7.5–11.1)
POTASSIUM SERPLBLD-SCNC: 3.4 MMOL/L (ref 3.5–5.1)
PROT SERPL-MCNC: 6.7 G/DL (ref 6.4–8.2)
RBC # BLD AUTO: 3.9 M/MM3 (ref 3.6–5.2)
SODIUM SERPL-SCNC: 139 MMOL/L (ref 136–145)
WBC # BLD AUTO: 7.5 K/MM3 (ref 4–10)

## 2020-09-01 RX ADMIN — SODIUM CHLORIDE, POTASSIUM CHLORIDE, SODIUM LACTATE AND CALCIUM CHLORIDE SCH MLS/HR: 600; 310; 30; 20 INJECTION, SOLUTION INTRAVENOUS at 12:32

## 2020-09-01 RX ADMIN — MORPHINE SULFATE PRN MG: 2 INJECTION, SOLUTION INTRAMUSCULAR; INTRAVENOUS at 13:10

## 2020-09-01 RX ADMIN — MORPHINE SULFATE PRN MG: 2 INJECTION, SOLUTION INTRAMUSCULAR; INTRAVENOUS at 05:31

## 2020-09-01 NOTE — DS
Physical Exam: 


SUBJECTIVE: Patient seen and examined. No acute events overnight. 








OBJECTIVE:





                                   Vital Signs











 Period  Temp  Pulse  Resp  BP Sys/Miranda  Pulse Ox


 


 Last 24 Hr  98.2 F-99.6 F  70-86  12-19  110-119/63-81  








PHYSICAL EXAM





GENERAL: The patient is awake, alert, and fully oriented, in no acute distress.


HEAD: Normal with no signs of trauma.


EYES: \clera anicteric, conjunctiva clear. 


ENT: Ears normal, nares patent, oropharynx clear without exudates, moist mucous 

membranes.


NECK: Trachea midline, full range of motion, supple. 


LUNGS: Breath sounds equal, clear to auscultation bilaterally, no wheezes, no 

crackles, no accessory muscle use. 


HEART: Regular rate and rhythm, S1, S2 without murmur


ABDOMEN: Soft, incisional tenderness, BS+, non distended


EXTREMITIES: 2+ pulses, warm, nocalf tenderness, well-perfused, no edema. 


NEUROLOGICAL: Normal speech, gait not observed.


PSYCH: Normal mood, normal affect.


SKIN: Warm, dry, normal turgor





LABS


                         Laboratory Results - last 24 hr











  08/31/20 09/01/20 09/01/20





  01:25 09:25 09:25


 


WBC   7.5 


 


RBC   3.90 


 


Hgb   12.6 


 


Hct   36.2 


 


MCV   92.8 


 


MCH   32.4 


 


MCHC   34.9 


 


RDW   12.6 


 


Plt Count   314 


 


MPV   7.7 


 


Sodium    139


 


Potassium    3.4 L


 


Chloride    104


 


Carbon Dioxide    29


 


Anion Gap    7 L


 


BUN    5.8 L


 


Creatinine    0.8


 


Est GFR (CKD-EPI)AfAm    109.93


 


Est GFR (CKD-EPI)NonAf    94.85


 


Random Glucose    152 H


 


Calcium    9.0


 


Magnesium    2.4


 


Total Bilirubin    1.2 H


 


AST    52 H


 


ALT    43


 


Alkaline Phosphatase    71


 


Total Protein    6.7


 


Albumin    3.4


 


COVID-19 (GANESH)  Not detected  











HOSPITAL COURSE:





Date of Admission:08/30/20





Date of Discharge: 09/01/20








Pt. is a 36 yo. F presenting with biliary colic. Pt. found to have gallstone at 

the gallbladder neck without definitive evidence of cholecystitis on Abd. US or 

CT Abd./Pelvis. Pt. started in IVF and give IV Ceftriaxone and Flagyl shaista-

operatively. Pt. was seen by GI and Surgery and taken for CCY. Pt. had minimal 

EBL and ROBBY drain was placed overnight. Hospital follow up and medication 

adjustments as detailed below.     


Minutes to complete discharge: 25





Discharge Summary


Problems reviewed: Yes


Reason For Visit: CHOLECYSTITIS


Condition: Stable





- Instructions


Diet, Activity, Other Instructions: 


You came in for abdominal pain. We imaged your abdomen and noted that you had 

gall stones in your gall bladder. You were seen by a GI doctor and surgeon. We 

removed your gallbladder. 





Please follow up with your surgeon within 1 week as detailed below. 


Please follow up with your PCP within 1 week. 


  





Dr. Thayer Discharge Instructions





Dear EPE MANZANARES,





Post Operative Instructions





Physical activity


Resume your normal everyday activity as tolerated no heavy lifting or exercise 

until seen by your surgeon. You may walk unlimited amounts of and climb stairs. 

You may resume driving the car when you feel safe and comfortable behind the 

wheel.





Wound care


If you have a bandage, leave it on, and keep dry for 48 - 72 hours. After that 

time discard the outer bandage. If there are tapes on the skin under the outer 

bandage, leave them in place. They will peel off in the next 7 to 10 days. Do 

Not peel them off. You may shower 2 days after surgery. If there are tapes 

present on the skin, they can get wet.





Diet


There are no dietary restrictions. Eat healthy, high-fiber foods. Drink 6 to 8 

glasses of liquid each day. This will assist in keeping your bowels are regular.





Pain management


You may take Tylenol or acetaminophen or Ibuprofen (for example, Motrin, Advil 

etc.) Any pain prescription medication ordered should be taken as prescribed for

moderate to severe pain.





Call Dr. Thayer for any of the following:





Severe pain not relieved by medication


Fever of 101 or higher


Excessive bleeding or drainage on dressing


Inability to urinate





Call the office at [phone] for a post operative appointment in 7 - 10 days.





Referrals: 


[facility] [place] [Provider Group] - 1 Week


Mamadou Thayer MD [Staff Physician] - 1 Week


Disposition: HOME





- Home Medications


Comprehensive Discharge Medication List: 


Ambulatory Orders





NK [No Known Home Medication]  08/31/20 








This patient is new to me today: Yes


Date on this admission: 09/01/20


Emergency Visit: Yes


ED Registration Date: 08/30/20


Care time: The patient presented to the Emergency Department on the above date 

and was hospitalized for further evaluation of their emergent condition.


Critical Care patient: No





- Discharge Referral


Referred to [facility] No





ATTENDING PHYSICIAN STATEMENT





I saw and evaluated the patient.


I reviewed the resident's note and discussed the case with the resident.


I agree with the resident's findings and plan as documented.








SUBJECTIVE:








OBJECTIVE:








ASSESSMENT AND PLAN:

## 2020-09-01 NOTE — PN
Teaching Attending Note


Name of Resident: Stefano Verdugo





ATTENDING PHYSICIAN STATEMENT





I saw and evaluated the patient.


I reviewed the resident's note and discussed the case with the resident.


I agree with the resident's findings and plan as documented.








SUBJECTIVE:


Seen and examined at bedside.  Patient is tolerating p.o. and is ambulatory.  V

ital signs stable.  Abdominal exam nontender.  Patient reports passed flatus.  

ROBBY drain has been removed by surgery and surgery has cleared the patient for 

discharge.  Patient is medically cleared for discharge.





OBJECTIVE


                                Last Vital Signs











Temp Pulse Resp BP Pulse Ox


 


 98.6 F   71   16   113/63   100 


 


 09/01/20 06:00  09/01/20 06:00  09/01/20 06:00  09/01/20 06:00  09/01/20 06:00











PE: Per resident note


Labs/Imaging: reviewed





ASSESSMENT/PLAN


35-year-old female presented with right upper quadrant abdominal pain and 

diagnosed with cholecystitis.  Patient underwent laparoscopic cholecystectomy 

with insertion of ROBBY drain.  ROBBY drain was removed without complication.  Patient

is medically cleared for discharge

## 2020-09-01 NOTE — PN
Progress Note (short form)





- Note


Progress Note: 














POD 1, s/p lap niyah








Pt seen and examined. States she is doing well, pain is controlled with current 

regimen. Has been oob to the restroom voiding without issue. Tolerating PO. 

Denies cp/sob, n/v/d. 











                                   Vital Signs











Temp  98.6 F   09/01/20 06:00


 


Pulse  71   09/01/20 06:00


 


Resp  16   09/01/20 06:00


 


BP  113/63   09/01/20 06:00


 


Pulse Ox  100   09/01/20 06:00








                                 Intake & Output











 08/31/20 08/31/20 09/01/20





 11:59 23:59 11:59


 


Intake Total 0 2050 1350


 


Output Total  555 550


 


Balance 0 1495 800


 


Weight 133 lb  


 


Intake:   


 


  IV  1700 900


 


    Lactated Ringers Solution   900





    1,000 ml @ 75 mls/hr IV   





    ASDIR RIYA Rx#:[id number]   


 


    Normal Saline - 1,000 ml  700 





    @ 1000 mls/hr IV ASDIR   





    STA Rx#:[id number]   


 


  IVPB  100 


 


  Oral 0 250 450


 


Output:   


 


  Drainage  130 50


 


    Right Abdomen  100 50


 


  Urine  400 500


 


    Void  400 500


 


  Estimated Blood Loss  25 


 


Other:   


 


  Voiding Method Toilet Toilet Toilet


 


  # Unmeasured Voids   


 


    Void  3 1


 


  Bowel Movement No No No


 


  Height 5 ft  


 


  Body Mass Index (BMI) 25.9  


 


  Weight Measurement Method Built in [facility]  











Gen: awake, alert, nad


Resp: unlabored on RA


Abdo: soft, minimally ttp at epigastrc incision, dermabond in place on all 

incisions, c/d/i. RLQ with drain in place, scant serosanguinous drainage in 

reservoir and tubing, tubing striped. 











A/P: 36 y/o F w/ no significant PMHx a/w c/o abdo pain, found to have acute c

holecystitis, now POD 1, s/p lap niyah





afebrile, vss


labs pending








-continue to advance diet


-if pain controlled and pain controlled pt can be d/c with f/u in 1 week





d/w attending Dr Thayer

## 2020-09-02 NOTE — PATH
Surgical Pathology Report



Patient Name:  PEE ABBOTT

Accession #:  [id number]

Med. Rec. #:  [id number]                                                        

   /Age/Gender:  1985 (Age: 35) / F

Account:  [id number]                                                          

             Location: [address] [place] [address]/[facility]

Taken:  2020

Received:  2020

Reported:  2020

Physicians:  MD COLTON Cantrell

  



Specimen(s) Received

 GALLBLADDER 





Clinical History

Cholecystitis







Final Diagnosis

GALLBLADDER, LAPAROSCOPIC CHOLECYSTECTOMY:

ACUTE AND CHRONIC CHOLECYSTITIS WITH CHOLELITHIASIS.

ONE BENIGN PERIDUCTAL LYMPH NODE (0/1).

ADHERENT SCANT BENIGN HEPATIC PARENCHYMA.







***Electronically Signed***

Padma Correa M.D.





Gross Description

Received in formalin, labeled "gallbladder," is an 8.2 x 2.5 x 2.4 cm.

gallbladder with a 0.2 cm. in length portion of cystic duct attached. There is a

0.7 cm in greatest dimension periductal lymph node attached. The outer surface

is tan-pink and varies from smooth to shaggy. The lumen contains tan, tenacious

bile as well as a 3.0 cm in greatest dimension tan-brown, ovoid cholelith. The

mucosa is tan-brown and focally eroded. The wall of the gallbladder averages 0.3

cm. in thickness. Representative sections are submitted in one cassette.  

/2020



[facility]

## 2020-09-02 NOTE — OP
DATE OF OPERATION:  08/31/2020

 

PREOPERATIVE DIAGNOSES:  Acute cholecystitis, cholelithiasis.

 

POSTOPERATIVE DIAGNOSES:  Acute cholecystitis, cholelithiasis.

 

PROCEDURE:  Laparoscopic cholecystectomy.

 

SURGEON:  Mamadou Thayer MD

 

ASSISTANT:  Ventura Olea PA-C 

 

ANESTHESIA:  General.

 

OPERATIVE FINDINGS:  Acute cholecystitis and cholelithiasis and a prominent 
cystic

duct lymph node.  The rest of the findings were unremarkable.

 

PROCEDURE:  The patient was placed on the operating table in the supine position
and

after the induction of general anesthesia the patient's abdomen was prepped with

ChloraPrep and draped in sterile fashion.  A timeout was taken and 
pneumoperitoneum

established above the umbilicus using a Veress needle.  Once 15 mmHg of pressure
were

obtained, a 5-mm port was placed at the umbilicus and additional lateral 5-mm 
ports

and a subxiphoid 12-mm port.  Laparoscopy was carried out and the previously 
noted

findings were observed.

 

Dissection was begun at the neck of the gallbladder where the peritoneum was 
opened

medially and laterally using blunt dissection and electrocautery.  The cystic 
duct

was identified coursing from the neck of the gallbladder towards the common bile
duct

and it was dissected using blunt dissection proximally and distally for length. 

Similarly, the artery was identified and dissected proximally and distally for

length.  A critical view of safety was taken and then the duct and the artery 
were

clipped twice proximally and twice distally with large hemoclips.  The duct and

artery were then serially divided using Endoshears.  Hemostasis was checked for 
and

noted to be good and then the gallbladder was removed from the liver bed in a

retrograde fashion using electrocautery.  Prior to removal from the edge of the

liver, hemostasis in the liver bed was again checked for and noted to be good 
and

then the gallbladder removed from the edge of the liver, placed in an EndoCatch,
and

brought out through the subxiphoid port.

 

Pneumoperitoneum was reestablished.  Copious irrigation was carried out with 
saline. 

Hemostasis was verified again.  A 10-mm Chris-Salmeron drain was placed in the 
right

hepatorenal fossa and brought out through 1 of the 5-mm ports and secured to the
skin

with 2-0 silk suture.  All port sites were removed under laparoscopic vision 
without

evidence of bleeding from the port sites.  The port sites were infiltrated with 
0.5%

Marcaine and the skin edges reapproximated with 4-0 Biosyn in a subcuticular

continuous fashion.  Steri-Strips and Band-Aid dressings were placed.  The drain
was

connected to bulb suction and then the patient aroused from general anesthesia 
and

transferred to the postanesthesia care unit in stable condition, awake and 
alert.

 

ESTIMATED BLOOD LOSS:  20 mL.

 

REPLACEMENT:  Crystalloid.

 

DRAINS:  One 10-mm Chris-Salmeron in the gallbladder fossa. 

 

SPECIMEN:  Gallbladder and contents to Pathology.

 

I, Mamadou Thayer, was physically present in the operating room from the time 
the

patient was placed on the operating table until she was transferred to the

postanesthesia care unit in [place].

 

 

MD MAURICE Yañez/[id number]

DD: 09/02/2020 12:30

DT: 09/02/2020 12:53

Job #:  [id number]

MTDD

## 2021-06-07 NOTE — ED ADULT NURSE NOTE - NSFALLRSKASSESASSIST_ED_ALL_ED
Quality 394b: Td/Tdap Immunizations For Adolescents: Patient had one tetanus, diphtheria toxoids and acellular pertussis vaccine (Tdap) on or between the patient's 10th and 13th birthdays.
Detail Level: Detailed
Quality 431: Preventive Care And Screening: Unhealthy Alcohol Use - Screening: Patient screened for unhealthy alcohol use using a single question and scores less than 2 times per year
Quality 402: Tobacco Use And Help With Quitting Among Adolescents: Patient screened for tobacco and never smoked
Quality 110: Preventive Care And Screening: Influenza Immunization: Influenza Immunization not Administered because Patient Refused.
Quality 394a: Meningococcal Immunizations For Adolescents: Patient had one dose of meningococcal vaccine (serogroups A, C, W, Y) on or between the patient's 11th and 13th birthdays.
Quality 226: Preventive Care And Screening: Tobacco Use: Screening And Cessation Intervention: Patient screened for tobacco use and is an ex/non-smoker
no

## 2022-05-21 ENCOUNTER — EMERGENCY (EMERGENCY)
Facility: HOSPITAL | Age: 37
LOS: 1 days | Discharge: HOME | End: 2022-05-21
Attending: INTERNAL MEDICINE | Admitting: INTERNAL MEDICINE
Payer: MEDICAID

## 2022-05-21 VITALS
DIASTOLIC BLOOD PRESSURE: 80 MMHG | WEIGHT: 134.48 LBS | RESPIRATION RATE: 17 BRPM | HEART RATE: 69 BPM | TEMPERATURE: 99 F | SYSTOLIC BLOOD PRESSURE: 113 MMHG | HEIGHT: 61 IN | OXYGEN SATURATION: 98 %

## 2022-05-21 PROBLEM — Z78.9 OTHER SPECIFIED HEALTH STATUS: Chronic | Status: ACTIVE | Noted: 2020-07-29

## 2022-05-21 LAB
FLUAV AG NPH QL: SIGNIFICANT CHANGE UP
FLUBV AG NPH QL: SIGNIFICANT CHANGE UP
RSV RNA NPH QL NAA+NON-PROBE: SIGNIFICANT CHANGE UP
SARS-COV-2 RNA SPEC QL NAA+PROBE: SIGNIFICANT CHANGE UP

## 2022-05-21 PROCEDURE — 99284 EMERGENCY DEPT VISIT MOD MDM: CPT

## 2022-05-21 PROCEDURE — 99283 EMERGENCY DEPT VISIT LOW MDM: CPT

## 2022-05-21 PROCEDURE — 87637 SARSCOV2&INF A&B&RSV AMP PRB: CPT

## 2022-05-21 RX ORDER — ACETAMINOPHEN 500 MG
650 TABLET ORAL ONCE
Refills: 0 | Status: COMPLETED | OUTPATIENT
Start: 2022-05-21 | End: 2022-05-21

## 2022-05-21 RX ADMIN — Medication 650 MILLIGRAM(S): at 15:01

## 2022-05-21 NOTE — ED PROVIDER NOTE - ATTENDING APP SHARED VISIT CONTRIBUTION OF CARE
38 yo female with no pertinent medical history, + tobacco smoker, complaining of 3 days of myalgia, general malaise, mild intermittent sore throat. Denies any chills, fevers, SOB, cough, chest pain, nausea, vomiting, diarrhea, rashes or any other complaint. +COVID19 vaccine, no known sick contacts.      Lungs CTA b/l all fields, no tonsillar exudates, no cervical lymphadenopathy.   COVID & Flu sent  Dr. Carvajal:  I have reviewed and discussed with the PA/ resident the case specifics, including the history, physical assessment, evaluation, conclusion, laboratory results, and medical plan. I agree with the contents, and conclusions. I have personally examined, and interviewed the patient.

## 2022-05-21 NOTE — ED PROVIDER NOTE - NSFOLLOWUPCLINICS_GEN_ALL_ED_FT
[facility]  Family Medicine  [address] [place] [id number]  Phone: [phone]  Fax:

## 2022-05-21 NOTE — ED ADULT NURSE NOTE - NSHOSCREENINGQ1_ED_ALL_ED
Next appt 6-15-20  Last appt 6-12-18    Refill request for  Sertraline 50mg #90 no refills  [facility] - [facility]    Refilled per [facility] med protocol.  
No

## 2022-05-21 NOTE — ED PROVIDER NOTE - NSFOLLOWUPINSTRUCTIONS_ED_ALL_ED_FT
Celso un seguimiento con la Clínica de medicina familiar para establecer la atención.    Jeanette de fumar.    Tylenol o Motrin según las indicaciones para el dolor o la fiebre.    Lindsey mucha agua y líquidos.    Regrese al servicio de urgencias si empeora o si tiene alguna inquietud.    Follow up with the Family Practice Clinic to establish care.    Stop smoking.    Tylenol or Motrin as directed for pain or fever.    Drink a lot of water and fluids.    Return to ED if worse or for any concerns.      Enfermedades virales en los adultos    Viral Illness, Adult      Los virus son microbios diminutos que entran en el organismo de nickolas persona y causan enfermedades. Hay muchos tipos de virus diferentes y causan muchas clases de enfermedades. Las enfermedades virales pueden ser leves o graves. Pueden afectar diferentes partes del cuerpo.    Entre las afecciones a corto plazo causadas por virus, se incluyen los resfríos y la gripe. Entre las afecciones a ramona plazo causadas por un virus, incluyen el herpes, la culebrilla y la infección por VIH (virus de inmunodeficiencia humana). Se berumen identificado unos pocos virus asociados con determinados tipos de cáncer.      ¿Cuáles son las causas?    Muchos tipos de virus pueden causar enfermedades. Los virus invaden las células del organismo, se multiplican y provocan que las células infectadas funcionen de manera anormal o mueran. Cuando estas células mueren, liberan más virus. Cuando esto ocurre, aparecen síntomas de la enfermedad, y el virus sigue diseminándose a otras células. Si el virus asume la función de la célula, puede hacer que esta se divida y prolifere de manera descontrolada. [place] ocurre cuando un virus causa cáncer.    Los diferentes virus ingresan al organismo de distintas formas. Puede contraer un virus de la siguiente manera:  •Al ingerir alimentos o beber agua que berumen entrado en contacto con el virus (están contaminados).      •Al inhalar gotitas que nickolas persona infectada liberó en el aire al toser o estornudar.      •Al tocar nickolas superficie contaminada con el virus y luego llevarse la mano a la boca, la nariz o los ojos.      •Al ser irving por un insecto o mordido por un animal que son portadores del virus.      •Al tener contacto sexual con nickolas persona infectada por el virus.      •Al tener contacto con marino o líquidos que contienen el virus, ya sea a través de un ibis abierto o corrina nickolas transfusión.      Si el virus ingresa al organismo, el sistema de defensa del cuerpo (sistema inmunitario) intentará combatirlo. Puede correr un riesgo más alto de tener nickolas enfermedad viral si tiene el sistema inmunitario debilitado.      ¿Cuáles son los signos o síntomas?    Puede tener los siguientes síntomas, dependiendo del tipo de virus y de la ubicación de las células que invade:•Virus del resfrío y de la gripe:  •Fiebre.      •Dolor de jareth.      •Dolor de garganta.      •Mary musculares.      •Nariz tapada (congestión nasal).      •Tos.      •Virus del aparato digestivo (gastrointestinales):  •Fiebre.      •Dolor en el abdomen.      •Náuseas.      •Diarrea.      •Virus hepáticos (hepatitis):  •Pérdida del apetito.      •Cansancio.      •La piel o las partes janine de los ojos se ponen phillip (ictericia).      •Virus del cerebro y la médula montoya:  •Fiebre.      •Dolor de jareth.      •Rigidez en el nathan.      •Náuseas y vómitos.      •Confusión o somnolencia.      •Virus de la piel:  •Verrugas.      •Picazón.      •Erupción cutánea.      •Virus de transmisión sexual:  •Secreción.      •Hinchazón.      •Enrojecimiento.      •Erupción cutánea.          ¿Cómo se diagnostica?    Esta afección se puede diagnosticar en función de lo siguiente:  •Síntomas.      •Antecedentes médicos.      •Examen físico.      •Análisis de marino, nickolas muestra de mucosidad de los pulmones (muestra de esputo), muestra de heces o un hisopado de líquidos corporales o nickolas llaga de la piel (lesión).        ¿Cómo se trata?    Los virus pueden ser difíciles de tratar porque se hospedan en las células. Los antibióticos no tratan los virus porque estos medicamentos no llegan al interior de las células. El tratamiento de nickolas enfermedad viral puede incluir lo siguiente:  •Descansar y beber abundantes líquidos.      •Medicamentos para aliviar los síntomas. Estos pueden incluir medicamentos de venta lizette para el dolor y la fiebre, medicamentos para la tos o la congestión y medicamentos para aliviar la diarrea.      •Medicamentos antivirales. Estos medicamentos están disponibles únicamente para ciertos tipos de virus.      Algunas enfermedades virales pueden evitarse con vacunas. Un ejemplo frecuente es la vacuna antigripal.      Siga estas instrucciones en blankenship casa:    Medicamentos     •Use los medicamentos de venta lizette y los recetados solamente kayla se lo haya indicado el médico.      •Si le recetaron un medicamento antiviral, tómelo kayla se lo haya indicado el médico. No deje de chidi el antiviral aunque comience a sentirse mejor.    •Infórmese sobre cuándo los antibióticos son necesarios y cuándo no. Los antibióticos no combaten a los virus. Si tiene nickolas infección viral y el médico barbi que también tiene nickolas infección bacteriana, o que está en riesgo de contraerla, kamila vez le recete un antibiótico.  •No solicite nickolas receta para antibióticos si le berumen diagnosticado nickolas enfermedad viral. Los antibióticos no harán que se cure más rápidamente.      •Chidi antibióticos con frecuencia cuando no son necesarios puede derivar en resistencia a los antibióticos. Cuando esto ocurre, el medicamento pierde blankenship eficacia contra las bacterias que normalmente combate.          Indicaciones generales      •Lindsey suficiente líquido para mantener la orina de color amarillo pálido.      •Descanse todo lo que pueda.      •Retome jayla actividades normales según lo indicado por el médico. Pregúntele al médico qué actividades son seguras para usted.      •Concurra a todas las visitas de seguimiento kayla se lo haya indicado el médico. [place] es importante.        ¿Cómo se previene?     Para reducir el riesgo de tener nickolas enfermedad viral:  •Lávese las jacqueline frecuentemente con agua y jabón corrina al menos 20 segundos. Use desinfectante para jacqueline si no dispone de agua y jabón.      •Evite tocarse la nariz, los ojos y la boca, sobre todo si no se lavó las jacqueline recientemente.      •Si un miembro de la jaret tiene nickolas infección viral, limpie todas las superficies de la casa que puedan bria estado en contacto con el virus. Use Tribal y jabón. También puede usar lejía con agua agregada (diluido).      •Manténgase lejos de las personas enfermas con síntomas de nickolas infección viral.      •No comparta objetos tales kayla cepillos de dientes y botellas de agua con otras personas.      •Mantenga las vacunas al día. [place] incluye recibir la vacuna antigripal todos los años.      •Siga nickolas dieta saludable y descanse lo suficiente.        Comuníquese con un médico si:    •Tiene síntomas de nickolas enfermedad viral que no desaparecen.      •Los síntomas regresan después de bria desaparecido.      •Jayla síntomas empeoran.        Solicite ayuda de inmediato si tiene:    •Dificultad para respirar.      •Dolor de jareth intenso o rigidez en el nathan.      •Vómitos abundantes o dolor en el abdomen.      Estos síntomas pueden representar un problema grave que constituye nickolas emergencia. No espere a abhijeet si los síntomas desaparecen. Solicite atención médica de inmediato. Comuníquese con el servicio de emergencias de blankenship localidad (911 en los [place]). No conduzca por jayla propios medios hasta el hospital.       Resumen    •Los virus son tipos de microbios que entran en el organismo de nickolas persona y causan enfermedades. Las enfermedades virales pueden ser leves o graves. Pueden afectar diferentes partes del cuerpo.      •Los virus pueden ser difíciles de tratar. Hay medicamentos para aliviar los síntomas y hay algunos medicamentos antivirales.      •Si le recetaron un medicamento antiviral, tómelo kayla se lo haya indicado el médico. No deje de chidi el antiviral aunque comience a sentirse mejor.      •Comuníquese con un médico si tiene síntomas de nickolas enfermedad viral que no desaparecen.      Esta información no tiene kayla fin reemplazar el consejo del médico. Asegúrese de hacerle al médico cualquier pregunta que tenga.

## 2022-05-21 NOTE — ED PROVIDER NOTE - CLINICAL SUMMARY MEDICAL DECISION MAKING FREE TEXT BOX
3 days of myalgia, general malaise, mild intermittent sore throat      COVID & Flu sent 36 yo female with no pertinent medical history, + tobacco smoker, complaining of 3 days of myalgia, general malaise, mild intermittent sore throat. Denies any chills, fevers, SOB, cough, chest pain, nausea, vomiting, diarrhea, rashes or any other complaint. +COVID19 vaccine, no known sick contacts.      Lungs CTA b/l all fields, no tonsillar exudates, no cervical lymphadenopathy.   COVID & Flu sent

## 2022-05-21 NOTE — ED PROVIDER NOTE - NS ED ATTENDING STATEMENT MOD
This was a shared visit with the MAGDALENO. I reviewed and verified the documentation and independently performed the documented:

## 2022-05-21 NOTE — ED ADULT NURSE NOTE - WILL THE PATIENT ACCEPT THE PFIZER COVID-19 VACCINE IF ELIGIBLE AND IT IS AVAILABLE?
If your symptoms worsen within 24-48 hours, you will need to go to the ER(Emergency Room) asap(as soon as possible).    Drink lots of water    Await for urine culture    Use moist towelette to wipe.           *-*-*-*-*-*-*-  [facility]    Monday - Friday 8 a.m. - 8 p.m.  Saturday - Sunday (and holidays) 8 a.m. - 4 p.m.  *-*-*-*-*-*-*-  [email or web address]  See current wait times for [facility] in real-time!  Reserve your waiting-room spot in line!   Receive text/email messages if our wait times are long,  so that you can do your waiting at home or work, instead of in our waiting room.     Note: This system does not guarantee an \"appointment time\" to see a provider. Also, patients may be called out of the waiting room \"ahead of turn\" in emergency situations, at discretion of Urgent Care staff.  ----------------  Thank you for choosing [facility] today. We hope you had a pleasant experience and we look forward to serving your future needs.    · If you have any questions about your VISIT, please call [phone].    · If you have any questions about your BILL, please call [phone].    · If you need a copy of your MEDICAL RECORD, please call [phone].    If you receive a survey in the mail about today's services, we hope that you will take a few minutes to let us know about your experience.  --------------------------------------------------------------------------------------------------------------  UNLESS OTHERWISE INSTRUCTED BY YOUR URGENT CARE PROVIDER TODAY, all follow-up for your medical issues should be managed by your primary care provider. The Urgent Care does not manage chronic medical issues or refill medications. You are responsible for scheduling and keeping any necessary follow-up visits with your primary care provider after this visit today.    --------------------------------------------------------------------------------------------------------------  IF YOU WERE PRESCRIBED AN ANTIBIOTIC TODAY: We recommend taking an over-the-counter probiotic (Such as Florajen 3 for adults, or [id number] for children -- AVAILABLE IN THE [facility] and other local pharmacies too) once a day for the entire duration of your antibiotics, and continuing it for 2 weeks after the antibiotics are finished. This will help reduce your chance of developing antibiotic-related diarrhea and/or yeast infections.  --------------------------------------------------------------------------------------------------------------      Patient Education     Dysuria, Infection vs. Chemical (Child)    The urethra is the channel that passes urine from the bladder. In a girl, the opening of the urethra is above the vagina. In a boy, it is at the tip of the penis. \"Dysuria\" is feeling pain or burning in the urethra when passing urine.  Dysuria can be caused by anything that irritates or inflames the urethra. The cause for your child's dysuria is not certain. The most common cause of dysuria in young children is chemical irritation. Soaps, bubble baths, or skin lotions that get inside the urethra can cause this reaction. Symptoms will get better in 1 to 3 days after the last exposure.  Sometimes a bladder infection causes dysuria. A urine test can show this. A bacterial bladder infection is treated with antibiotics. Sometimes children can get a viral infection of the bladder. This will get better with time. No antibiotics are needed for a viral infection.  Dysuria may also occur in young girls with inflammation in the outer vaginal area (rash or vaginal infection). Treatment is directed at the cause of the outer vaginal irritation. You may be given a cream for this.  A vaginal infection may cause vaginal discharge and dysuria. A culture can diagnose this. Treatment with  antibiotics may be needed.  Labial adhesions are a common cause of dysuria in young girls. Parts of the labia are attached together. A small tear can cause pain. The tear will get better on its own, but an estrogen cream can be used to help treat the adhesions.  Minor trauma as a result from activities or self-exploration can also lead to dysuria.  Rarely, dysuria is a result of local trauma from sexual abuse. If you have concerns about possible sexual abuse, contact your child's healthcare provider right away. Or, you can call the national child abuse hotline at 979-3-Q-CHILD (367-843-6928) to get help.  Home care  The following tips will help you care for your child at home:  · Wash the genitals gently with a washcloth and soapy water. Make sure soap does not get inside the urethra. Dry the area well.  · If you think bubble bath soap caused the reaction, avoid bubble baths in the future.  · Over-the-counter diaper creams may be used to help with irritation in the genital area.  Follow-up care  Follow up with your child's healthcare provider, or as advised. If a culture specimen was taken, you may call for the result as directed.  When to seek medical advice  Call your child's healthcare provider right away if any of these occur:  · Symptoms do not go away after 3 days  · Fever (See Fever and children, below)  · Inability to urinate due to pain  · Increased redness or rash in the genital area  · Discharge/bloody drainage from the penis or vagina     Fever and children  Always use a digital thermometer to check your child’s temperature. Never use a mercury thermometer.  For infants and toddlers, be sure to use a rectal thermometer correctly. A rectal thermometer may accidentally poke a hole in (perforate) the rectum. It may also pass on germs from the stool. Always follow the product maker’s directions for proper use. If you don’t feel comfortable taking a rectal temperature, use another method. When you talk to  your child’s healthcare provider, tell him or her which method you used to take your child’s temperature.  Here are guidelines for fever temperature. Ear temperatures aren’t accurate before 6 months of age. Don’t take an oral temperature until your child is at least 4 years old.  Infant under 3 months old:  · Ask your child’s healthcare provider how you should take the temperature.  · Rectal or forehead (temporal artery) temperature of 100.4°F (38°C) or higher, or as directed by the provider  · Armpit temperature of 99°F (37.2°C) or higher, or as directed by the provider  Child age 3 to 36 months:  · Rectal, forehead (temporal artery), or ear temperature of 102°F (38.9°C) or higher, or as directed by the provider  · Armpit temperature of 101°F (38.3°C) or higher, or as directed by the provider  Child of any age:  · Repeated temperature of 104°F (40°C) or higher, or as directed by the provider  · Fever that lasts more than 24 hours in a child under 2 years old. Or a fever that lasts for 3 days in a child 2 years or older.   Date Last Reviewed: 9/1/2016  © 9198-9634 [employer]. [address] [place] [address], [place] [address]. All rights reserved. This information is not intended as a substitute for professional medical care. Always follow your healthcare professional's instructions.            Not applicable

## 2022-05-21 NOTE — ED PROVIDER NOTE - OBJECTIVE STATEMENT
36 yo female with no pertinent medical history, + tobacco smoker, complaining of 3 days of myalgia, general malaise, mild intermittent sore throat. Denies any chills, fevers, SOB, cough, chest pain, nausea, vomiting, diarrhea, rashes or any other complaint. +COVID19 vaccine, no known sick contacts.

## 2023-01-25 ENCOUNTER — EMERGENCY (EMERGENCY)
Facility: HOSPITAL | Age: 38
LOS: 1 days | Discharge: HOME | End: 2023-01-25
Attending: STUDENT IN AN ORGANIZED HEALTH CARE EDUCATION/TRAINING PROGRAM
Payer: MEDICAID

## 2023-01-25 VITALS
WEIGHT: 164.91 LBS | DIASTOLIC BLOOD PRESSURE: 83 MMHG | OXYGEN SATURATION: 99 % | RESPIRATION RATE: 20 BRPM | HEIGHT: 65 IN | HEART RATE: 69 BPM | SYSTOLIC BLOOD PRESSURE: 120 MMHG | TEMPERATURE: 98 F

## 2023-01-25 VITALS
TEMPERATURE: 99 F | SYSTOLIC BLOOD PRESSURE: 110 MMHG | RESPIRATION RATE: 17 BRPM | HEART RATE: 62 BPM | DIASTOLIC BLOOD PRESSURE: 73 MMHG | OXYGEN SATURATION: 98 %

## 2023-01-25 LAB
ALBUMIN SERPL ELPH-MCNC: 5 G/DL — SIGNIFICANT CHANGE UP (ref 3.3–5)
ALP SERPL-CCNC: 72 U/L — SIGNIFICANT CHANGE UP (ref 40–120)
ALT FLD-CCNC: 19 U/L — SIGNIFICANT CHANGE UP (ref 10–45)
ANION GAP SERPL CALC-SCNC: 16 MMOL/L — SIGNIFICANT CHANGE UP (ref 5–17)
APPEARANCE UR: CLEAR — SIGNIFICANT CHANGE UP
APTT BLD: 30 SEC — SIGNIFICANT CHANGE UP (ref 27.5–35.5)
AST SERPL-CCNC: 20 U/L — SIGNIFICANT CHANGE UP (ref 10–40)
BACTERIA # UR AUTO: NEGATIVE — SIGNIFICANT CHANGE UP
BASE EXCESS BLDV CALC-SCNC: 0.3 MMOL/L — SIGNIFICANT CHANGE UP (ref -2–3)
BASOPHILS # BLD AUTO: 0.01 K/UL — SIGNIFICANT CHANGE UP (ref 0–0.2)
BASOPHILS NFR BLD AUTO: 0.1 % — SIGNIFICANT CHANGE UP (ref 0–2)
BILIRUB SERPL-MCNC: 0.6 MG/DL — SIGNIFICANT CHANGE UP (ref 0.2–1.2)
BILIRUB UR-MCNC: NEGATIVE — SIGNIFICANT CHANGE UP
BUN SERPL-MCNC: 10 MG/DL — SIGNIFICANT CHANGE UP (ref 7–23)
CA-I SERPL-SCNC: 1.17 MMOL/L — SIGNIFICANT CHANGE UP (ref 1.15–1.33)
CALCIUM SERPL-MCNC: 9.9 MG/DL — SIGNIFICANT CHANGE UP (ref 8.4–10.5)
CHLORIDE BLDV-SCNC: 101 MMOL/L — SIGNIFICANT CHANGE UP (ref 96–108)
CHLORIDE SERPL-SCNC: 101 MMOL/L — SIGNIFICANT CHANGE UP (ref 96–108)
CO2 BLDV-SCNC: 26 MMOL/L — SIGNIFICANT CHANGE UP (ref 22–26)
CO2 SERPL-SCNC: 20 MMOL/L — LOW (ref 22–31)
COLOR SPEC: COLORLESS — SIGNIFICANT CHANGE UP
CREAT SERPL-MCNC: 0.58 MG/DL — SIGNIFICANT CHANGE UP (ref 0.5–1.3)
DIFF PNL FLD: NEGATIVE — SIGNIFICANT CHANGE UP
EGFR: 119 ML/MIN/1.73M2 — SIGNIFICANT CHANGE UP
EOSINOPHIL # BLD AUTO: 0.07 K/UL — SIGNIFICANT CHANGE UP (ref 0–0.5)
EOSINOPHIL NFR BLD AUTO: 1 % — SIGNIFICANT CHANGE UP (ref 0–6)
EPI CELLS # UR: 7 /HPF — HIGH
FLUAV AG NPH QL: SIGNIFICANT CHANGE UP
FLUBV AG NPH QL: SIGNIFICANT CHANGE UP
GAS PNL BLDV: 134 MMOL/L — LOW (ref 136–145)
GAS PNL BLDV: SIGNIFICANT CHANGE UP
GLUCOSE BLDV-MCNC: 102 MG/DL — HIGH (ref 70–99)
GLUCOSE SERPL-MCNC: 104 MG/DL — HIGH (ref 70–99)
GLUCOSE UR QL: NEGATIVE — SIGNIFICANT CHANGE UP
HCO3 BLDV-SCNC: 25 MMOL/L — SIGNIFICANT CHANGE UP (ref 22–29)
HCT VFR BLD CALC: 40.5 % — SIGNIFICANT CHANGE UP (ref 34.5–45)
HCT VFR BLDA CALC: 44 % — SIGNIFICANT CHANGE UP (ref 34.5–46.5)
HGB BLD CALC-MCNC: 14.5 G/DL — SIGNIFICANT CHANGE UP (ref 11.7–16.1)
HGB BLD-MCNC: 13.9 G/DL — SIGNIFICANT CHANGE UP (ref 11.5–15.5)
HYALINE CASTS # UR AUTO: 0 /LPF — SIGNIFICANT CHANGE UP (ref 0–2)
IMM GRANULOCYTES NFR BLD AUTO: 0.1 % — SIGNIFICANT CHANGE UP (ref 0–0.9)
INR BLD: 1.13 RATIO — SIGNIFICANT CHANGE UP (ref 0.88–1.16)
KETONES UR-MCNC: ABNORMAL
LACTATE BLDV-MCNC: 2.3 MMOL/L — HIGH (ref 0.5–2)
LEUKOCYTE ESTERASE UR-ACNC: ABNORMAL
LYMPHOCYTES # BLD AUTO: 2.64 K/UL — SIGNIFICANT CHANGE UP (ref 1–3.3)
LYMPHOCYTES # BLD AUTO: 38.2 % — SIGNIFICANT CHANGE UP (ref 13–44)
MCHC RBC-ENTMCNC: 30.8 PG — SIGNIFICANT CHANGE UP (ref 27–34)
MCHC RBC-ENTMCNC: 34.3 GM/DL — SIGNIFICANT CHANGE UP (ref 32–36)
MCV RBC AUTO: 89.8 FL — SIGNIFICANT CHANGE UP (ref 80–100)
MONOCYTES # BLD AUTO: 0.43 K/UL — SIGNIFICANT CHANGE UP (ref 0–0.9)
MONOCYTES NFR BLD AUTO: 6.2 % — SIGNIFICANT CHANGE UP (ref 2–14)
NEUTROPHILS # BLD AUTO: 3.76 K/UL — SIGNIFICANT CHANGE UP (ref 1.8–7.4)
NEUTROPHILS NFR BLD AUTO: 54.4 % — SIGNIFICANT CHANGE UP (ref 43–77)
NITRITE UR-MCNC: NEGATIVE — SIGNIFICANT CHANGE UP
NRBC # BLD: 0 /100 WBCS — SIGNIFICANT CHANGE UP (ref 0–0)
PCO2 BLDV: 38 MMHG — LOW (ref 39–42)
PH BLDV: 7.42 — SIGNIFICANT CHANGE UP (ref 7.32–7.43)
PH UR: 6.5 — SIGNIFICANT CHANGE UP (ref 5–8)
PLATELET # BLD AUTO: 282 K/UL — SIGNIFICANT CHANGE UP (ref 150–400)
PO2 BLDV: 30 MMHG — SIGNIFICANT CHANGE UP (ref 25–45)
POTASSIUM BLDV-SCNC: 3.2 MMOL/L — LOW (ref 3.5–5.1)
POTASSIUM SERPL-MCNC: 3.2 MMOL/L — LOW (ref 3.5–5.3)
POTASSIUM SERPL-SCNC: 3.2 MMOL/L — LOW (ref 3.5–5.3)
PROT SERPL-MCNC: 7.8 G/DL — SIGNIFICANT CHANGE UP (ref 6–8.3)
PROT UR-MCNC: NEGATIVE — SIGNIFICANT CHANGE UP
PROTHROM AB SERPL-ACNC: 13.1 SEC — SIGNIFICANT CHANGE UP (ref 10.5–13.4)
RBC # BLD: 4.51 M/UL — SIGNIFICANT CHANGE UP (ref 3.8–5.2)
RBC # FLD: 12 % — SIGNIFICANT CHANGE UP (ref 10.3–14.5)
RBC CASTS # UR COMP ASSIST: 1 /HPF — SIGNIFICANT CHANGE UP (ref 0–4)
RSV RNA NPH QL NAA+NON-PROBE: SIGNIFICANT CHANGE UP
SAO2 % BLDV: 51.3 % — LOW (ref 67–88)
SARS-COV-2 RNA SPEC QL NAA+PROBE: DETECTED
SODIUM SERPL-SCNC: 137 MMOL/L — SIGNIFICANT CHANGE UP (ref 135–145)
SP GR SPEC: 1.02 — SIGNIFICANT CHANGE UP (ref 1.01–1.02)
TROPONIN T, HIGH SENSITIVITY RESULT: <6 NG/L — SIGNIFICANT CHANGE UP (ref 0–51)
TROPONIN T, HIGH SENSITIVITY RESULT: <6 NG/L — SIGNIFICANT CHANGE UP (ref 0–51)
UROBILINOGEN FLD QL: NEGATIVE — SIGNIFICANT CHANGE UP
WBC # BLD: 6.92 K/UL — SIGNIFICANT CHANGE UP (ref 3.8–10.5)
WBC # FLD AUTO: 6.92 K/UL — SIGNIFICANT CHANGE UP (ref 3.8–10.5)
WBC UR QL: 10 /HPF — HIGH (ref 0–5)

## 2023-01-25 PROCEDURE — 85014 HEMATOCRIT: CPT

## 2023-01-25 PROCEDURE — 93005 ELECTROCARDIOGRAM TRACING: CPT

## 2023-01-25 PROCEDURE — 85610 PROTHROMBIN TIME: CPT

## 2023-01-25 PROCEDURE — 85018 HEMOGLOBIN: CPT

## 2023-01-25 PROCEDURE — 82435 ASSAY OF BLOOD CHLORIDE: CPT

## 2023-01-25 PROCEDURE — 71045 X-RAY EXAM CHEST 1 VIEW: CPT | Mod: 26

## 2023-01-25 PROCEDURE — 82947 ASSAY GLUCOSE BLOOD QUANT: CPT

## 2023-01-25 PROCEDURE — 84702 CHORIONIC GONADOTROPIN TEST: CPT

## 2023-01-25 PROCEDURE — 81001 URINALYSIS AUTO W/SCOPE: CPT

## 2023-01-25 PROCEDURE — 83605 ASSAY OF LACTIC ACID: CPT

## 2023-01-25 PROCEDURE — 80053 COMPREHEN METABOLIC PANEL: CPT

## 2023-01-25 PROCEDURE — 87086 URINE CULTURE/COLONY COUNT: CPT

## 2023-01-25 PROCEDURE — 70498 CT ANGIOGRAPHY NECK: CPT | Mod: 26,MA

## 2023-01-25 PROCEDURE — 82565 ASSAY OF CREATININE: CPT

## 2023-01-25 PROCEDURE — 82962 GLUCOSE BLOOD TEST: CPT

## 2023-01-25 PROCEDURE — 70496 CT ANGIOGRAPHY HEAD: CPT | Mod: MA

## 2023-01-25 PROCEDURE — 82803 BLOOD GASES ANY COMBINATION: CPT

## 2023-01-25 PROCEDURE — 82330 ASSAY OF CALCIUM: CPT

## 2023-01-25 PROCEDURE — 70450 CT HEAD/BRAIN W/O DYE: CPT | Mod: MA

## 2023-01-25 PROCEDURE — 70498 CT ANGIOGRAPHY NECK: CPT | Mod: MA

## 2023-01-25 PROCEDURE — 71045 X-RAY EXAM CHEST 1 VIEW: CPT

## 2023-01-25 PROCEDURE — 84295 ASSAY OF SERUM SODIUM: CPT

## 2023-01-25 PROCEDURE — 84132 ASSAY OF SERUM POTASSIUM: CPT

## 2023-01-25 PROCEDURE — 70496 CT ANGIOGRAPHY HEAD: CPT | Mod: 26,MA

## 2023-01-25 PROCEDURE — 99284 EMERGENCY DEPT VISIT MOD MDM: CPT

## 2023-01-25 PROCEDURE — 85730 THROMBOPLASTIN TIME PARTIAL: CPT

## 2023-01-25 PROCEDURE — 99285 EMERGENCY DEPT VISIT HI MDM: CPT | Mod: 25

## 2023-01-25 PROCEDURE — 84484 ASSAY OF TROPONIN QUANT: CPT

## 2023-01-25 PROCEDURE — 85025 COMPLETE CBC W/AUTO DIFF WBC: CPT

## 2023-01-25 PROCEDURE — 87637 SARSCOV2&INF A&B&RSV AMP PRB: CPT

## 2023-01-25 RX ORDER — POTASSIUM CHLORIDE 20 MEQ
40 PACKET (EA) ORAL ONCE
Refills: 0 | Status: COMPLETED | OUTPATIENT
Start: 2023-01-25 | End: 2023-01-25

## 2023-01-25 RX ADMIN — Medication 40 MILLIEQUIVALENT(S): at 15:21

## 2023-01-25 NOTE — ED PROVIDER NOTE - CLINICAL SUMMARY MEDICAL DECISION MAKING FREE TEXT BOX
pettet attending- Sudden onset right-sided weakness reassuring neuro exam NIH stroke scale 0 strength and sensation intact throughout ambulatory without assistance code stroke canceled by neurology otherwise will obtain CTh CT angiogram evaluate for dizziness unlikely post cva, ACS PE pneumothorax dissection AAA pneumonia will obtain 3-hour troponin EKG with chest pain disposition pending work-up and reevaluation's likely discharge home.

## 2023-01-25 NOTE — ED PROVIDER NOTE - PATIENT PORTAL LINK FT
You can access the FollowMyHealth Patient Portal offered by [facility] by registering at the following website: [email or web address]. By joining [facility]’s FollowMyHealth portal, you will also be able to view your health information using other applications (apps) compatible with our system.

## 2023-01-25 NOTE — ED PROVIDER NOTE - PHYSICAL EXAMINATION
Physical Exam:  Gen: NAD, AOx3, non-toxic appearing  Head: normal appearing  HEENT: normal conjunctiva, oral mucosa moist  Lung:  no respiratory distress, speaking in full sentences, clear to ascultation bilaterally     CV: regular rate and rhythm   Abd: soft, ND, NT  MSK: no visible deformities  Neuro: No focal deficits  Skin: Warm  Psych: normal affect  ~Anthony Camarillo D.O. -ED Attending

## 2023-01-25 NOTE — ED ADULT NURSE NOTE - OBJECTIVE STATEMENT
38 y/o F with no significant PMH presents to ED complaining of numbness. Pt reports numbness in right upper, lower extremities and facer starting today around 1415. Pt reports also feeling dizzy like the room was spinning. Pt reported generalized weakness. Pt was visiting  in hospital and reports feeling very stressed and nervous. Code stroke was initiated at 1429. Pt taken directly to CT scan. Code stroke was then cancelled by ED team. Denies headache, vision changes, shortness of breath, abdominal pain, nausea, vomiting, diarrhea, fevers, chills, dysuria, hematuria, recent illness travel or fall

## 2023-01-25 NOTE — ED PROVIDER NOTE - NSFOLLOWUPINSTRUCTIONS_ED_ALL_ED_FT
There were no signs of an emergency medical condition on completion of today's workup.  You will need further medical care and evaluation. A presumptive diagnosis has been made, however further evaluation may be required by your primary care doctor or specialist for a definitive diagnosis.      Follow up with your medical doctor in 2-3 days or call our clinic at [phone] and state you were seen in the Emergency Department and would like to be seen in clinic. You may also call [phone] to speak with a representative to assist follow up care with medicine, surgery, or specialists.    If you are having pain, take Tylenol/acetaminophen 1 g every six hours and supplement (if allowed by your physician, and if you're not having gastric/gastrointestinal/stomach/intestinal problems) with ibuprofen 600 mg, with food or milk/maalox, every six hours which can be taken three hours apart from the Tylenol to have a layered effect.     Be sure to take no more than 4000mg or 4g of Tylenol/acetaminophen in a 24 hour period. Be sure to check your other medications to see if they include Tylenol/acetaminophen and include them in your calculations to ensure you do not take more than 4000mg or 4g of Tylenol/acetaminophen a day.    Drink at least 2 Liters or 64 Ounces of water each day (UNLESS you are supposed to restrict fluids or have a history of congestive heart failure (CHF)).    Return for any persistent, worsening symptoms, or ANY concerns at all. No había signos de nickolas condición médica de emergencia al finalizar el estudio de madison. Necesitará más atención médica y evaluación. Se cheng realizado un diagnóstico presuntivo, sin embargo, blankenship médico de atención primaria o especialista puede requerir nickolas evaluación adicional para un diagnóstico definitivo.  Celso un seguimiento con blankenship médico en 2 o 3 días o llame a nuestra clínica al [phone] e indique que lo vieron en el Departamento de Emergencias y que le gustaría que lo atiendan en la clínica. También puede llamar al [phone] para hablar con un representante que lo ayude con la atención de seguimiento con medicamentos, cirugía o especialistas.  Si tiene dolor, tome Tylenol/acetaminofeno 1 g cada seis horas y complemente (si blankenship médico lo permite y si no tiene problemas gástricos/gastrointestinales/estocales/intestinales) con ibuprofeno 600 mg, con alimentos o leche/ maalox, cada seis horas que se puede chidi con nova horas de diferencia con el Tylenol para tener un efecto en capas.  Asegúrese de no chidi más de 4000 mg o 4 g de Tylenol/acetaminofén en un período de 24 horas. Asegúrese de revisar farrah otros medicamentos para abhijeet si incluyen Tylenol/acetaminofeno e inclúyalos en farrah cálculos para asegurarse de no chidi más de 4000 mg o 4 g de Tylenol/acetaminofeno al día.  Lindsey al menos 2 litros o 64 onzas de agua todos los días (A MENOS QUE deba restringir los líquidos o tenga antecedentes de insuficiencia cardíaca congestiva (CHF, por farrah siglas en inglés)).  Regrese por cualquier síntoma persistente o que empeore, o CUALQUIER inquietud.

## 2023-01-25 NOTE — ED PROVIDER NOTE - OBJECTIVE STATEMENT
nkechi attending- 37-year-old female no reported past medical history presenting with acute onset right face right upper extremity right lower extremity numbness that began at 2:15 PM this afternoon.  Reports being with her  and being very stressed out because he has been admitted to the hospital for 6 days now and felt sudden onset right-sided symptoms.  Denies weakness denies gait instability denies problems with speech.  Code stroke activated by triage nurse neurology canceled code stroke due to NIH stroke scale of 0. Denies recent trauma, fevers, chills, headache, nausea, vomiting, dysuria, freq, hematuria, diarrhea, constipation, shortness of breath, cough. Did endorse slight right-sided chest pain during this event but resolved prior to evaluation here in the ER. Does also endorse some room spinning dizziness when symptoms occurred but denies at present

## 2023-01-25 NOTE — ED PROVIDER NOTE - ATTENDING CONTRIBUTION TO CARE
I, Anthony Camarillo, performed a history and physical exam of the patient and discussed their management with the resident and/or advanced care provider. I reviewed the resident and/or advanced care provider's note and agree with the documented findings and plan of care. I was present and available for all procedures.    see my full note for details

## 2023-01-25 NOTE — ED PROVIDER NOTE - PROGRESS NOTE DETAILS
Tone Early MD: On reevaluation patient endorsing total resolution of symptoms in the right upper extremity and denies any chest pain, shortness of breath or any abdominal pain.  Told patient about her CT results and she is aware as well as the UA being slightly concerning for UTI.  Patient denies any dysuria, hematuria or any flank pain and states that she does have some vaginal itchiness but is currently being treated for genital herpes with acyclovir.  At this time we will forego antibiotics but told that if she starts to develop any of the symptoms to seek medical attention for possible UTI treatment.  We will repeat patient's troponin and if within normal limits likely discharge home.    Obtain with  [id number]

## 2023-01-26 LAB
CULTURE RESULTS: SIGNIFICANT CHANGE UP
SPECIMEN SOURCE: SIGNIFICANT CHANGE UP

## 2023-03-01 ENCOUNTER — EMERGENCY (EMERGENCY)
Facility: HOSPITAL | Age: 38
LOS: 1 days | Discharge: HOME | End: 2023-03-01
Attending: EMERGENCY MEDICINE
Payer: MEDICAID

## 2023-03-01 VITALS
WEIGHT: 139.99 LBS | HEIGHT: 65 IN | RESPIRATION RATE: 18 BRPM | TEMPERATURE: 98 F | DIASTOLIC BLOOD PRESSURE: 79 MMHG | OXYGEN SATURATION: 97 % | HEART RATE: 92 BPM | SYSTOLIC BLOOD PRESSURE: 106 MMHG

## 2023-03-01 LAB
BASOPHILS # BLD AUTO: 0.02 K/UL — SIGNIFICANT CHANGE UP (ref 0–0.2)
BASOPHILS NFR BLD AUTO: 0.3 % — SIGNIFICANT CHANGE UP (ref 0–2)
EOSINOPHIL # BLD AUTO: 0.08 K/UL — SIGNIFICANT CHANGE UP (ref 0–0.5)
EOSINOPHIL NFR BLD AUTO: 1.3 % — SIGNIFICANT CHANGE UP (ref 0–6)
HCT VFR BLD CALC: 41.3 % — SIGNIFICANT CHANGE UP (ref 34.5–45)
HGB BLD-MCNC: 13.6 G/DL — SIGNIFICANT CHANGE UP (ref 11.5–15.5)
IMM GRANULOCYTES NFR BLD AUTO: 0.2 % — SIGNIFICANT CHANGE UP (ref 0–0.9)
LYMPHOCYTES # BLD AUTO: 2.21 K/UL — SIGNIFICANT CHANGE UP (ref 1–3.3)
LYMPHOCYTES # BLD AUTO: 36.5 % — SIGNIFICANT CHANGE UP (ref 13–44)
MCHC RBC-ENTMCNC: 30.8 PG — SIGNIFICANT CHANGE UP (ref 27–34)
MCHC RBC-ENTMCNC: 32.9 GM/DL — SIGNIFICANT CHANGE UP (ref 32–36)
MCV RBC AUTO: 93.7 FL — SIGNIFICANT CHANGE UP (ref 80–100)
MONOCYTES # BLD AUTO: 0.36 K/UL — SIGNIFICANT CHANGE UP (ref 0–0.9)
MONOCYTES NFR BLD AUTO: 5.9 % — SIGNIFICANT CHANGE UP (ref 2–14)
NEUTROPHILS # BLD AUTO: 3.38 K/UL — SIGNIFICANT CHANGE UP (ref 1.8–7.4)
NEUTROPHILS NFR BLD AUTO: 55.8 % — SIGNIFICANT CHANGE UP (ref 43–77)
NRBC # BLD: 0 /100 WBCS — SIGNIFICANT CHANGE UP (ref 0–0)
PLATELET # BLD AUTO: 277 K/UL — SIGNIFICANT CHANGE UP (ref 150–400)
RBC # BLD: 4.41 M/UL — SIGNIFICANT CHANGE UP (ref 3.8–5.2)
RBC # FLD: 12.2 % — SIGNIFICANT CHANGE UP (ref 10.3–14.5)
WBC # BLD: 6.06 K/UL — SIGNIFICANT CHANGE UP (ref 3.8–10.5)
WBC # FLD AUTO: 6.06 K/UL — SIGNIFICANT CHANGE UP (ref 3.8–10.5)

## 2023-03-01 PROCEDURE — 83735 ASSAY OF MAGNESIUM: CPT

## 2023-03-01 PROCEDURE — 85025 COMPLETE CBC W/AUTO DIFF WBC: CPT

## 2023-03-01 PROCEDURE — 84100 ASSAY OF PHOSPHORUS: CPT

## 2023-03-01 PROCEDURE — 36415 COLL VENOUS BLD VENIPUNCTURE: CPT

## 2023-03-01 PROCEDURE — 84484 ASSAY OF TROPONIN QUANT: CPT

## 2023-03-01 PROCEDURE — 93005 ELECTROCARDIOGRAM TRACING: CPT

## 2023-03-01 PROCEDURE — 84443 ASSAY THYROID STIM HORMONE: CPT

## 2023-03-01 PROCEDURE — 99284 EMERGENCY DEPT VISIT MOD MDM: CPT

## 2023-03-01 PROCEDURE — 99285 EMERGENCY DEPT VISIT HI MDM: CPT

## 2023-03-01 PROCEDURE — 80053 COMPREHEN METABOLIC PANEL: CPT

## 2023-03-01 NOTE — ED PROVIDER NOTE - NSFOLLOWUPINSTRUCTIONS_ED_ALL_ED_FT
Please follow up with a primary care physician within the next 4-6 days.    Please follow up with a neurologist within the next 4-6 days.    There are no signs of emergency conditions requiring admission to the hospital on today's workup. Based on the evaluation, a presumptive diagnosis was made, however, further evaluation may be required by your primary care physician or a specialist for a more definitive diagnosis. Please bring a copy of your discharge paperwork (including any test results) to your doctor. Therefore, please follow-up as directed or return to the Emergency Department if your symptoms change or worsen.     Please return to the emergency department if you experience any of the following symptoms:    Fever  Chest pain  Difficulty breathing  Abdominal pain  Nausea  Vomiting

## 2023-03-01 NOTE — ED PROVIDER NOTE - CLINICAL SUMMARY MEDICAL DECISION MAKING FREE TEXT BOX
38-year-old female with no past medical history presenting with dizziness for 1 month intermittently, seen in the emergency department 1 month ago did not follow-up with outpatient physician. Patient has no focal neurological deficits on exam, denies chest pain, shortness of breath, nausea, vomiting. CBC, CMP, magnesium, phosphorus, troponin, TSH, EKG.

## 2023-03-01 NOTE — ED ADULT NURSE NOTE - OBJECTIVE STATEMENT
38y female no PMH to the ED from home via triage c/o of dizziness. Pt reports persistent dizziness over the past month which comes and goes. Pt has been seen in the ER 2x before for the same complaint but pt has not felt any better. Pt reports that during episode of dizziness pt feels numbness/tingling in the lower legs and states that vision becomes "tripled." Pt denies n/v. Reports mild headache pain at times. Pt bought a blood pressure machine and takes BP during episodes- "highest was 170s, lowest was 40s." Pt took  losartan 50mg x1 before coming because "pt was scared." pt education provided to only take prescription medications as prescribed by doctor- pt verbalized understanding. Interrupter Zaire #[id number] used. Stretcher in lowest position and locked, appropriate side rails in place, room cleared of clutter and safety hazards, call bell in reach- pt oriented to use, blankets given for comfort

## 2023-03-01 NOTE — ED PROVIDER NOTE - ATTENDING CONTRIBUTION TO CARE
Attending MD Wolf: I personally have seen and examined this patient.  Resident note reviewed and agree on plan of care and except where noted.  See below for details.     seen in [facility] 6    38F with no reported contributory PMH/PSH/Meds, no known drug allergies presents to the ED with dizziness for a month.  Reports over the last month has had episodes of feeling dizziness with associated palpitations.  Reports at time of dizziness feels drunk.  Reports took her BP during episode and took one of 's Losartan.  Reports did not improve symptoms.  Denies chest pain, shortness of breath, abdominal pain, nausea, vomiting, diarrhea, urinary complaints. Denies numbness, weakness or tingling in extremities. Denies loss of urinary or bowel continence. Denies change in vision, double vision, sudden loss of vision, change in hearing.  Review of EMR reveals that about a month ago patient evaluated in Emergency Department CODE STROKE without acute findings.  Reports has not followed up.      Exam:   General: NAD  HENT: head NCAT, airway patent  Eyes: anicteric, no conjunctival injection   Lungs: lungs CTAB with good inspiratory effort, no wheezing, no rhonchi, no rales  Cardiac: +S1S2, no obvious m/r/g  GI: abdomen soft with +BS, NT, ND  : no CVAT  MSK: FROM at neck, no calf tenderness, swelling, erythema or warmth   Neuro: CN 2-12 grossly intact, neg Elizabeth Hallpike, no dysmetria, no moving all extremities spontaneously with 5/5 strength, nonfocal  Psych: normal mood and affect     A/P: 38F with dizziness, will evaluate for but not limited to peripheral vs central causes, will obtain labs including tSH, will eval for metabolic derangement, if nonactionable, given recent imaging, neuro eval, can likely follow up outpatient

## 2023-03-01 NOTE — ED PROVIDER NOTE - PATIENT PORTAL LINK FT
You can access the FollowMyHealth Patient Portal offered by [facility] by registering at the following website: [email or web address]. By joining [facility]’s FollowMyHealth portal, you will also be able to view your health information using other applications (apps) compatible with our system.

## 2023-03-01 NOTE — ED PROVIDER NOTE - OBJECTIVE STATEMENT
38-year-old female with no past medical history presenting with dizziness. Patient has had intermittent episodes of dizziness described as feeling drunk over the last month approximately 2 episodes per week. Patient was on Zoom today and had sudden onset dizziness With associated palpitations. Patient checked her blood pressure and was elevated and took one of her husbands blood pressure medications losartan. Denies chest pain, fevers, shortness of breath, focal neurological deficits, abdominal pain, nausea, vomiting. Patient was seen about 1 month ago in the emergency department and was a code stroke, given internal medicine follow-up for primary care doctor. Patient states that she does not like doctors and did not go to that appointment.

## 2023-03-01 NOTE — ED PROVIDER NOTE - NSPTACCESSSVCSAPPTDETAILS_ED_ALL_ED_FT
Urgent - Patient requires neurology for dizziness and primary care physician. Patient speaks only Marshallese

## 2023-03-01 NOTE — ED PROVIDER NOTE - PHYSICAL EXAMINATION
gen: well appearing  Mentation: AAO x 3  psych: mood appropriate  HEENT: airway patent, conjunctivae clear bilaterally  Cardio: RRR, no m/r/g  Resp: normal BS b/l  GI: soft/nondistended/nontender  : no CVA tenderness  Neuro: sensation and motor function grossly intact, 5/5 muscle strength bilaterally in both UEX and JAMAAL, no dysmetria, negative Deisi-Hallpike  Skin: No evidence of rash  MSK: normal movement of all extremities  Lymph/Vasc: no LE edema

## 2023-03-02 VITALS
SYSTOLIC BLOOD PRESSURE: 102 MMHG | TEMPERATURE: 98 F | DIASTOLIC BLOOD PRESSURE: 68 MMHG | HEART RATE: 77 BPM | RESPIRATION RATE: 16 BRPM

## 2023-03-02 LAB
ALBUMIN SERPL ELPH-MCNC: 4.6 G/DL — SIGNIFICANT CHANGE UP (ref 3.3–5)
ALP SERPL-CCNC: 74 U/L — SIGNIFICANT CHANGE UP (ref 40–120)
ALT FLD-CCNC: 31 U/L — SIGNIFICANT CHANGE UP (ref 10–45)
ANION GAP SERPL CALC-SCNC: 13 MMOL/L — SIGNIFICANT CHANGE UP (ref 5–17)
AST SERPL-CCNC: 31 U/L — SIGNIFICANT CHANGE UP (ref 10–40)
BILIRUB SERPL-MCNC: 0.5 MG/DL — SIGNIFICANT CHANGE UP (ref 0.2–1.2)
BUN SERPL-MCNC: 11 MG/DL — SIGNIFICANT CHANGE UP (ref 7–23)
CALCIUM SERPL-MCNC: 9.2 MG/DL — SIGNIFICANT CHANGE UP (ref 8.4–10.5)
CHLORIDE SERPL-SCNC: 104 MMOL/L — SIGNIFICANT CHANGE UP (ref 96–108)
CO2 SERPL-SCNC: 19 MMOL/L — LOW (ref 22–31)
CREAT SERPL-MCNC: 0.48 MG/DL — LOW (ref 0.5–1.3)
EGFR: 123 ML/MIN/1.73M2 — SIGNIFICANT CHANGE UP
GLUCOSE SERPL-MCNC: 85 MG/DL — SIGNIFICANT CHANGE UP (ref 70–99)
MAGNESIUM SERPL-MCNC: 2.6 MG/DL — SIGNIFICANT CHANGE UP (ref 1.6–2.6)
PHOSPHATE SERPL-MCNC: 3.6 MG/DL — SIGNIFICANT CHANGE UP (ref 2.5–4.5)
POTASSIUM SERPL-MCNC: 4.2 MMOL/L — SIGNIFICANT CHANGE UP (ref 3.5–5.3)
POTASSIUM SERPL-SCNC: 4.2 MMOL/L — SIGNIFICANT CHANGE UP (ref 3.5–5.3)
PROT SERPL-MCNC: 7.6 G/DL — SIGNIFICANT CHANGE UP (ref 6–8.3)
SODIUM SERPL-SCNC: 136 MMOL/L — SIGNIFICANT CHANGE UP (ref 135–145)
TSH SERPL-MCNC: 4.28 UIU/ML — HIGH (ref 0.27–4.2)

## 2023-03-03 NOTE — ED POST DISCHARGE NOTE - DETAILS
3/3/2023:  ID# [id number], no answer,  left voicemail with instructions to call back the aftercare line. -Delon Hernandez PA-C  #[id number].  No answer, left voicemail for call back.  -Yasmany Goss PA-C LVM for pt to return call to ED - Page Van PA-C

## 2023-07-05 ENCOUNTER — APPOINTMENT (OUTPATIENT)
Dept: NEUROLOGY | Facility: CLINIC | Age: 38
End: 2023-07-05